# Patient Record
Sex: FEMALE | Race: WHITE | Employment: FULL TIME | URBAN - METROPOLITAN AREA
[De-identification: names, ages, dates, MRNs, and addresses within clinical notes are randomized per-mention and may not be internally consistent; named-entity substitution may affect disease eponyms.]

---

## 2017-05-30 ENCOUNTER — HOSPITAL ENCOUNTER (OUTPATIENT)
Dept: MAMMOGRAPHY | Age: 67
Discharge: HOME | End: 2017-05-30
Attending: FAMILY MEDICINE
Payer: MEDICARE

## 2017-05-30 DIAGNOSIS — Z12.31 VISIT FOR SCREENING MAMMOGRAM: ICD-10-CM

## 2017-05-30 PROCEDURE — 77067 SCR MAMMO BI INCL CAD: CPT

## 2018-07-10 ENCOUNTER — HOSPITAL ENCOUNTER (OUTPATIENT)
Dept: MAMMOGRAPHY | Age: 68
Discharge: HOME | End: 2018-07-10
Attending: FAMILY MEDICINE
Payer: MEDICARE

## 2018-07-10 DIAGNOSIS — Z12.39 SCREENING BREAST EXAMINATION: ICD-10-CM

## 2018-07-10 PROCEDURE — 77067 SCR MAMMO BI INCL CAD: CPT

## 2019-07-16 ENCOUNTER — HOSPITAL ENCOUNTER (OUTPATIENT)
Dept: MAMMOGRAPHY | Age: 69
Discharge: HOME | End: 2019-07-16
Attending: FAMILY MEDICINE
Payer: MEDICARE

## 2019-07-16 DIAGNOSIS — Z12.31 VISIT FOR SCREENING MAMMOGRAM: ICD-10-CM

## 2019-07-16 PROCEDURE — 77067 SCR MAMMO BI INCL CAD: CPT

## 2020-08-27 ENCOUNTER — HOSPITAL ENCOUNTER (OUTPATIENT)
Dept: MAMMOGRAPHY | Age: 70
Discharge: HOME | End: 2020-08-27
Attending: NURSE PRACTITIONER
Payer: MEDICARE

## 2020-08-27 DIAGNOSIS — Z12.31 ENCOUNTER FOR MAMMOGRAM TO ESTABLISH BASELINE MAMMOGRAM: ICD-10-CM

## 2020-08-27 PROCEDURE — 77067 SCR MAMMO BI INCL CAD: CPT

## 2021-08-03 ENCOUNTER — TRANSCRIBE ORDER (OUTPATIENT)
Dept: SCHEDULING | Age: 71
End: 2021-08-03

## 2021-08-03 DIAGNOSIS — Z12.31 VISIT FOR SCREENING MAMMOGRAM: Primary | ICD-10-CM

## 2021-09-02 ENCOUNTER — HOSPITAL ENCOUNTER (OUTPATIENT)
Dept: MAMMOGRAPHY | Age: 71
Discharge: HOME | End: 2021-09-02
Attending: NURSE PRACTITIONER
Payer: MEDICARE

## 2021-09-02 DIAGNOSIS — Z12.31 VISIT FOR SCREENING MAMMOGRAM: ICD-10-CM

## 2021-09-02 PROCEDURE — 77067 SCR MAMMO BI INCL CAD: CPT

## 2021-11-05 ENCOUNTER — TRANSCRIBE ORDER (OUTPATIENT)
Dept: SCHEDULING | Age: 71
End: 2021-11-05

## 2021-11-05 DIAGNOSIS — M54.12 CERVICAL RADICULITIS: Primary | ICD-10-CM

## 2021-11-05 DIAGNOSIS — M54.2 CERVICALGIA: ICD-10-CM

## 2021-11-16 ENCOUNTER — HOSPITAL ENCOUNTER (OUTPATIENT)
Dept: MRI IMAGING | Age: 71
Discharge: HOME | End: 2021-11-16
Attending: FAMILY MEDICINE
Payer: MEDICARE

## 2021-11-16 DIAGNOSIS — M54.12 CERVICAL RADICULITIS: ICD-10-CM

## 2021-11-16 DIAGNOSIS — M54.2 CERVICALGIA: ICD-10-CM

## 2021-11-16 PROCEDURE — 72141 MRI NECK SPINE W/O DYE: CPT

## 2021-12-20 ENCOUNTER — APPOINTMENT (OUTPATIENT)
Dept: CT IMAGING | Age: 71
DRG: 177 | End: 2021-12-20
Attending: EMERGENCY MEDICINE
Payer: MEDICARE

## 2021-12-20 ENCOUNTER — APPOINTMENT (OUTPATIENT)
Dept: GENERAL RADIOLOGY | Age: 71
DRG: 177 | End: 2021-12-20
Attending: EMERGENCY MEDICINE
Payer: MEDICARE

## 2021-12-20 ENCOUNTER — HOSPITAL ENCOUNTER (INPATIENT)
Age: 71
LOS: 2 days | Discharge: HOME HEALTH | DRG: 177 | End: 2021-12-22
Attending: EMERGENCY MEDICINE | Admitting: HOSPITALIST
Payer: MEDICARE

## 2021-12-20 DIAGNOSIS — J12.82 PNEUMONIA DUE TO COVID-19 VIRUS: Primary | ICD-10-CM

## 2021-12-20 DIAGNOSIS — U07.1 PNEUMONIA DUE TO COVID-19 VIRUS: Primary | ICD-10-CM

## 2021-12-20 DIAGNOSIS — J96.01 ACUTE RESPIRATORY FAILURE WITH HYPOXIA (HCC): ICD-10-CM

## 2021-12-20 LAB
ALBUMIN SERPL-MCNC: 2.9 G/DL (ref 3.5–5)
ALBUMIN/GLOB SERPL: 0.9 {RATIO} (ref 1.1–2.2)
ALP SERPL-CCNC: 57 U/L (ref 45–117)
ALT SERPL-CCNC: 34 U/L (ref 12–78)
ANION GAP SERPL CALC-SCNC: 5 MMOL/L (ref 5–15)
AST SERPL-CCNC: 42 U/L (ref 15–37)
ATRIAL RATE: 91 BPM
BASOPHILS # BLD: 0 K/UL (ref 0–0.1)
BASOPHILS NFR BLD: 0 % (ref 0–1)
BILIRUB SERPL-MCNC: 0.3 MG/DL (ref 0.2–1)
BUN SERPL-MCNC: 7 MG/DL (ref 6–20)
BUN/CREAT SERPL: 10 (ref 12–20)
CALCIUM SERPL-MCNC: 8.2 MG/DL (ref 8.5–10.1)
CALCULATED P AXIS, ECG09: 48 DEGREES
CALCULATED R AXIS, ECG10: 48 DEGREES
CALCULATED T AXIS, ECG11: 34 DEGREES
CHLORIDE SERPL-SCNC: 102 MMOL/L (ref 97–108)
CO2 SERPL-SCNC: 29 MMOL/L (ref 21–32)
COVID-19 RAPID TEST, COVR: DETECTED
CREAT SERPL-MCNC: 0.68 MG/DL (ref 0.55–1.02)
CRP SERPL-MCNC: 1.1 MG/DL (ref 0–0.6)
D DIMER PPP FEU-MCNC: 1.33 MG/L FEU (ref 0–0.65)
DIAGNOSIS, 93000: NORMAL
DIFFERENTIAL METHOD BLD: ABNORMAL
EOSINOPHIL # BLD: 0 K/UL (ref 0–0.4)
EOSINOPHIL NFR BLD: 0 % (ref 0–7)
ERYTHROCYTE [DISTWIDTH] IN BLOOD BY AUTOMATED COUNT: 12.6 % (ref 11.5–14.5)
GLOBULIN SER CALC-MCNC: 3.3 G/DL (ref 2–4)
GLUCOSE SERPL-MCNC: 139 MG/DL (ref 65–100)
HCT VFR BLD AUTO: 42.1 % (ref 35–47)
HGB BLD-MCNC: 13.9 G/DL (ref 11.5–16)
IMM GRANULOCYTES # BLD AUTO: 0 K/UL (ref 0–0.04)
IMM GRANULOCYTES NFR BLD AUTO: 1 % (ref 0–0.5)
LDH SERPL L TO P-CCNC: 270 U/L (ref 81–246)
LYMPHOCYTES # BLD: 0.7 K/UL (ref 0.8–3.5)
LYMPHOCYTES NFR BLD: 17 % (ref 12–49)
MCH RBC QN AUTO: 30 PG (ref 26–34)
MCHC RBC AUTO-ENTMCNC: 33 G/DL (ref 30–36.5)
MCV RBC AUTO: 90.9 FL (ref 80–99)
MONOCYTES # BLD: 0.2 K/UL (ref 0–1)
MONOCYTES NFR BLD: 6 % (ref 5–13)
NEUTS SEG # BLD: 3 K/UL (ref 1.8–8)
NEUTS SEG NFR BLD: 76 % (ref 32–75)
NRBC # BLD: 0 K/UL (ref 0–0.01)
NRBC BLD-RTO: 0 PER 100 WBC
P-R INTERVAL, ECG05: 116 MS
PLATELET # BLD AUTO: 129 K/UL (ref 150–400)
PMV BLD AUTO: 12.2 FL (ref 8.9–12.9)
POTASSIUM SERPL-SCNC: 3.6 MMOL/L (ref 3.5–5.1)
PROCALCITONIN SERPL-MCNC: <0.05 NG/ML
PROT SERPL-MCNC: 6.2 G/DL (ref 6.4–8.2)
Q-T INTERVAL, ECG07: 336 MS
QRS DURATION, ECG06: 66 MS
QTC CALCULATION (BEZET), ECG08: 413 MS
RBC # BLD AUTO: 4.63 M/UL (ref 3.8–5.2)
RBC MORPH BLD: ABNORMAL
SODIUM SERPL-SCNC: 136 MMOL/L (ref 136–145)
SOURCE, COVRS: ABNORMAL
VENTRICULAR RATE, ECG03: 91 BPM
WBC # BLD AUTO: 3.9 K/UL (ref 3.6–11)

## 2021-12-20 PROCEDURE — 80053 COMPREHEN METABOLIC PANEL: CPT

## 2021-12-20 PROCEDURE — 93005 ELECTROCARDIOGRAM TRACING: CPT

## 2021-12-20 PROCEDURE — 84145 PROCALCITONIN (PCT): CPT

## 2021-12-20 PROCEDURE — 96374 THER/PROPH/DIAG INJ IV PUSH: CPT

## 2021-12-20 PROCEDURE — 86140 C-REACTIVE PROTEIN: CPT

## 2021-12-20 PROCEDURE — 71275 CT ANGIOGRAPHY CHEST: CPT

## 2021-12-20 PROCEDURE — 36415 COLL VENOUS BLD VENIPUNCTURE: CPT

## 2021-12-20 PROCEDURE — 99285 EMERGENCY DEPT VISIT HI MDM: CPT

## 2021-12-20 PROCEDURE — 74011250637 HC RX REV CODE- 250/637: Performed by: HOSPITALIST

## 2021-12-20 PROCEDURE — 74011250636 HC RX REV CODE- 250/636: Performed by: HOSPITALIST

## 2021-12-20 PROCEDURE — 74011250636 HC RX REV CODE- 250/636: Performed by: EMERGENCY MEDICINE

## 2021-12-20 PROCEDURE — 65660000000 HC RM CCU STEPDOWN

## 2021-12-20 PROCEDURE — 87635 SARS-COV-2 COVID-19 AMP PRB: CPT

## 2021-12-20 PROCEDURE — 83615 LACTATE (LD) (LDH) ENZYME: CPT

## 2021-12-20 PROCEDURE — 85025 COMPLETE CBC W/AUTO DIFF WBC: CPT

## 2021-12-20 PROCEDURE — 74011000636 HC RX REV CODE- 636: Performed by: HOSPITALIST

## 2021-12-20 PROCEDURE — 85379 FIBRIN DEGRADATION QUANT: CPT

## 2021-12-20 PROCEDURE — 71045 X-RAY EXAM CHEST 1 VIEW: CPT

## 2021-12-20 RX ORDER — ZINC SULFATE 50(220)MG
1 CAPSULE ORAL DAILY
Status: DISCONTINUED | OUTPATIENT
Start: 2021-12-20 | End: 2021-12-22 | Stop reason: HOSPADM

## 2021-12-20 RX ORDER — SODIUM CHLORIDE 9 MG/ML
75 INJECTION, SOLUTION INTRAVENOUS CONTINUOUS
Status: DISPENSED | OUTPATIENT
Start: 2021-12-20 | End: 2021-12-21

## 2021-12-20 RX ORDER — ENOXAPARIN SODIUM 100 MG/ML
40 INJECTION SUBCUTANEOUS DAILY
Status: DISCONTINUED | OUTPATIENT
Start: 2021-12-21 | End: 2021-12-22 | Stop reason: HOSPADM

## 2021-12-20 RX ORDER — ONDANSETRON 2 MG/ML
4 INJECTION INTRAMUSCULAR; INTRAVENOUS
Status: DISCONTINUED | OUTPATIENT
Start: 2021-12-20 | End: 2021-12-22 | Stop reason: HOSPADM

## 2021-12-20 RX ORDER — SODIUM CHLORIDE 0.9 % (FLUSH) 0.9 %
5-40 SYRINGE (ML) INJECTION EVERY 8 HOURS
Status: DISCONTINUED | OUTPATIENT
Start: 2021-12-20 | End: 2021-12-22 | Stop reason: HOSPADM

## 2021-12-20 RX ORDER — ALBUTEROL SULFATE 90 UG/1
2 AEROSOL, METERED RESPIRATORY (INHALATION)
Status: DISCONTINUED | OUTPATIENT
Start: 2021-12-20 | End: 2021-12-22 | Stop reason: HOSPADM

## 2021-12-20 RX ORDER — ACETAMINOPHEN 325 MG/1
650 TABLET ORAL
Status: DISCONTINUED | OUTPATIENT
Start: 2021-12-20 | End: 2021-12-22 | Stop reason: HOSPADM

## 2021-12-20 RX ORDER — SODIUM CHLORIDE 0.9 % (FLUSH) 0.9 %
5-40 SYRINGE (ML) INJECTION AS NEEDED
Status: DISCONTINUED | OUTPATIENT
Start: 2021-12-20 | End: 2021-12-22 | Stop reason: HOSPADM

## 2021-12-20 RX ORDER — DEXAMETHASONE SODIUM PHOSPHATE 10 MG/ML
6 INJECTION INTRAMUSCULAR; INTRAVENOUS EVERY 24 HOURS
Status: DISCONTINUED | OUTPATIENT
Start: 2021-12-21 | End: 2021-12-20

## 2021-12-20 RX ORDER — DEXAMETHASONE SODIUM PHOSPHATE 4 MG/ML
6 INJECTION, SOLUTION INTRA-ARTICULAR; INTRALESIONAL; INTRAMUSCULAR; INTRAVENOUS; SOFT TISSUE ONCE
Status: COMPLETED | OUTPATIENT
Start: 2021-12-20 | End: 2021-12-20

## 2021-12-20 RX ORDER — ASCORBIC ACID 500 MG
500 TABLET ORAL 2 TIMES DAILY
Status: DISCONTINUED | OUTPATIENT
Start: 2021-12-20 | End: 2021-12-22 | Stop reason: HOSPADM

## 2021-12-20 RX ORDER — POLYETHYLENE GLYCOL 3350 17 G/17G
17 POWDER, FOR SOLUTION ORAL DAILY PRN
Status: DISCONTINUED | OUTPATIENT
Start: 2021-12-20 | End: 2021-12-22 | Stop reason: HOSPADM

## 2021-12-20 RX ORDER — ONDANSETRON 4 MG/1
4 TABLET, ORALLY DISINTEGRATING ORAL
Status: DISCONTINUED | OUTPATIENT
Start: 2021-12-20 | End: 2021-12-22 | Stop reason: HOSPADM

## 2021-12-20 RX ORDER — DEXAMETHASONE SODIUM PHOSPHATE 4 MG/ML
4 INJECTION, SOLUTION INTRA-ARTICULAR; INTRALESIONAL; INTRAMUSCULAR; INTRAVENOUS; SOFT TISSUE EVERY 24 HOURS
Status: DISCONTINUED | OUTPATIENT
Start: 2021-12-20 | End: 2021-12-22 | Stop reason: HOSPADM

## 2021-12-20 RX ORDER — VENLAFAXINE HYDROCHLORIDE 37.5 MG/1
75 CAPSULE, EXTENDED RELEASE ORAL DAILY
Status: DISCONTINUED | OUTPATIENT
Start: 2021-12-20 | End: 2021-12-22 | Stop reason: HOSPADM

## 2021-12-20 RX ORDER — ACETAMINOPHEN 650 MG/1
650 SUPPOSITORY RECTAL
Status: DISCONTINUED | OUTPATIENT
Start: 2021-12-20 | End: 2021-12-22 | Stop reason: HOSPADM

## 2021-12-20 RX ADMIN — DEXAMETHASONE SODIUM PHOSPHATE 6 MG: 4 INJECTION, SOLUTION INTRAMUSCULAR; INTRAVENOUS at 11:10

## 2021-12-20 RX ADMIN — VENLAFAXINE HYDROCHLORIDE 75 MG: 37.5 CAPSULE, EXTENDED RELEASE ORAL at 17:35

## 2021-12-20 RX ADMIN — Medication 10 ML: at 20:00

## 2021-12-20 RX ADMIN — IOPAMIDOL 80 ML: 755 INJECTION, SOLUTION INTRAVENOUS at 12:52

## 2021-12-20 RX ADMIN — SODIUM CHLORIDE 75 ML/HR: 9 INJECTION, SOLUTION INTRAVENOUS at 14:54

## 2021-12-20 RX ADMIN — OXYCODONE HYDROCHLORIDE AND ACETAMINOPHEN 500 MG: 500 TABLET ORAL at 22:08

## 2021-12-20 RX ADMIN — ACETAMINOPHEN 650 MG: 325 TABLET ORAL at 22:08

## 2021-12-20 RX ADMIN — ZINC SULFATE 220 MG (50 MG) CAPSULE 1 CAPSULE: CAPSULE at 14:51

## 2021-12-20 NOTE — PROGRESS NOTES
Transition of Care Plan:    RUR: 7 %, LOW  Disposition: Home vs SNF PT/OT consults pending   Follow up appointments: PCP  DME needed: TBD  Transportation at Discharge: pt's sister vs. BLS transport   [place] or means to access home:   Yes     IM Medicare Letter: 2nd IM Letter before   Is patient a BCPI-A Bundle:       N/A    If yes, was Bundle Letter given?:    Is patient a [place] and connected with the [place]? N/A  If yes, was [place] transfer form completed and [facility] notified? Caregiver Contact: Sister Rodolfo Santiago [phone]  Discharge Caregiver contacted prior to discharge? Unit CM to follow up before discharge    Reason for Admission:  COVID Pneumonia                      RUR Score:   7%                   Plan for utilizing home health:  TBD        PCP: First and Last name:  Dago Rodriguez NP     Name of Practice: [facility]    Are you a current patient: Yes/No:  Yes   Approximate date of last visit: few months ago    Can you participate in a virtual visit with your PCP:  Yes                    Current Advanced Directive/Advance Care Plan: Full Code  Advance Care Planning     General Advance Care Planning (ACP) Conversation      Date of Conversation: 12/20/2021  Conducted with: Patient with Decision Making Capacity    Healthcare Decision Maker:  Barbara Perez, Legal Next of Kin  No healthcare decision makers have been documented. Click here to complete [address] including selection of the Healthcare Decision Maker Relationship (ie \"Primary\")    Today we documented Decision Maker(s) consistent with Legal Next of Kin hierarchy.     Content/Action Overview:   DECLINED ACP conversation - will revisit periodically   Reviewed DNR/DNI and patient elects Full Code (Attempt Resuscitation)    Length of Voluntary ACP Conversation in minutes:  <16 minutes (Non-Billable)    Thais Musa RN                      Transition of Care Plan:                  '  CM placed call to patient's mobile phone  to discuss discharge planning. Patient name, , and demographics all verified in chart. Patient lives in a one story home with her . Patient's  is currently hospitalized here at [address] Patient is her 's main caregiver. Patient is independent of her ADLS/IDLS including driving. Patient does not use any DME. Preferred pharmacy is [facility]. Patient is active with her PCP. Care Management Interventions  PCP Verified by CM: Yes  Mode of Transport at Discharge: Other (see comment) (Patient's sister to transport vs. BLS transport if transition to SNF)  Transition of Care Consult (CM Consult): Discharge Planning  Discharge Durable Medical Equipment: No  Physical Therapy Consult: Yes  Occupational Therapy Consult: Yes  Speech Therapy Consult: No  Support Systems: Spouse/Significant Other,Other Family Member(s)  Confirm Follow Up Transport: Self  Discharge Location  Discharge Placement: Unable to determine at this time    Unit CM to continue to follow for discharge needs and planning.     Randi Spence, MARGARETTEN, RN, BSW   RN Care Manager

## 2021-12-20 NOTE — ED PROVIDER NOTES
EMERGENCY DEPARTMENT HISTORY AND PHYSICAL EXAM      Date: 12/20/2021  Patient Name: Sal Dolan    History of Presenting Illness     Chief Complaint   Patient presents with    Lethargy     Pt c/o generalized weakness, pain all over body and covid exposure. History Provided By: Patient    HPI: Sal Dolan, 70 y.o. female presents to the ED with cc of shortness of breath. 79-year-old female with no significant past medical history presents with lethargy and weakness. 79-year-old female reports symptoms started 2 weeks ago. Reports myalgias and body aches. She is not vaccinated against Covid. She reports she developed a cough as well as headache, loss of appetite and decreased p.o. intake. Her  tested positive for Covid and was admitted to the hospital a few days ago. Patient reports she is going to get tested today but felt so weak that EMS was called. There are no other complaints, changes, or physical findings at this time. PCP: Adarsh Santiago NP    No current facility-administered medications on file prior to encounter. Current Outpatient Medications on File Prior to Encounter   Medication Sig Dispense Refill    venlafaxine-SR (EFFEXOR XR) 75 mg capsule Take 75 mg by mouth daily.  [DISCONTINUED] aspirin (ASPIRIN) 325 mg tablet Take 325 mg by mouth daily.  [DISCONTINUED] Omega-3 Fatty Acids (FISH OIL) 300 mg Cap Take 600 mg by mouth.            Past History     Past Medical History:  Past Medical History:   Diagnosis Date    Arthritis     hip, neck, shoulder    Chronic pain     back    GERD (gastroesophageal reflux disease)     Psychiatric disorder     anxiety       Past Surgical History:  Past Surgical History:   Procedure Laterality Date    HX ORTHOPAEDIC  1980    Ulna nerve repair Right arm    HX ORTHOPAEDIC  1982    Tendon repair Right hand    HX TUBAL LIGATION  1989       Family History:  Family History   Problem Relation Age of Onset    Breast Cancer Paternal Grandmother     Heart Failure Mother     Heart Attack Father     Alcohol abuse Father        Social History:  Social History     Tobacco Use    Smoking status: Former Smoker     Quit date: 1996     Years since quittin.4    Smokeless tobacco: Not on file   Substance Use Topics    Alcohol use: Yes     Comment: social    Drug use: Not on file       Allergies: Allergies   Allergen Reactions    Lipitor [Atorvastatin] Myalgia     Muscle aches         Review of Systems   Review of Systems   Constitutional: Positive for appetite change and chills. Negative for activity change and fever. HENT: Negative for facial swelling and voice change. Eyes: Negative for redness. Respiratory: Positive for cough. Negative for shortness of breath and wheezing. Cardiovascular: Negative for chest pain and leg swelling. Gastrointestinal: Negative for abdominal pain, diarrhea, nausea and vomiting. Genitourinary: Negative for decreased urine volume. Musculoskeletal: Positive for arthralgias and myalgias. Negative for gait problem. Skin: Negative for pallor and rash. Neurological: Negative for tremors and facial asymmetry. Psychiatric/Behavioral: Negative for agitation. All other systems reviewed and are negative. Physical Exam   Physical Exam  Vitals and nursing note reviewed. Constitutional:       Comments: 77-year-old female, resting on stretcher, appears weak   HENT:      Head: Normocephalic and atraumatic. Cardiovascular:      Rate and Rhythm: Normal rate and regular rhythm. Heart sounds: No murmur heard. No friction rub. No gallop. Pulmonary:      Effort: Pulmonary effort is normal.      Breath sounds: Rhonchi (Bilateral lower lobes) present. Comments: Saturating 88% on room air  Abdominal:      Palpations: Abdomen is soft. Tenderness: There is no abdominal tenderness. Musculoskeletal:         General: No swelling. Normal range of motion. Cervical back: Normal range of motion. Skin:     General: Skin is warm. Capillary Refill: Capillary refill takes less than 2 seconds. Neurological:      General: No focal deficit present. Mental Status: She is alert. Psychiatric:         Mood and Affect: Mood normal.         Diagnostic Study Results     Labs -     Recent Results (from the past 12 hour(s))   CBC WITH AUTOMATED DIFF    Collection Time: 12/20/21  9:52 AM   Result Value Ref Range    WBC 3.9 3.6 - 11.0 K/uL    RBC 4.63 3.80 - 5.20 M/uL    HGB 13.9 11.5 - 16.0 g/dL    HCT 42.1 35.0 - 47.0 %    MCV 90.9 80.0 - 99.0 FL    MCH 30.0 26.0 - 34.0 PG    MCHC 33.0 30.0 - 36.5 g/dL    RDW 12.6 11.5 - 14.5 %    PLATELET 158 (L) 773 - 400 K/uL    MPV 12.2 8.9 - 12.9 FL    NRBC 0.0 0  WBC    ABSOLUTE NRBC 0.00 0.00 - 0.01 K/uL    NEUTROPHILS 76 (H) 32 - 75 %    LYMPHOCYTES 17 12 - 49 %    MONOCYTES 6 5 - 13 %    EOSINOPHILS 0 0 - 7 %    BASOPHILS 0 0 - 1 %    IMMATURE GRANULOCYTES 1 (H) 0.0 - 0.5 %    ABS. NEUTROPHILS 3.0 1.8 - 8.0 K/UL    ABS. LYMPHOCYTES 0.7 (L) 0.8 - 3.5 K/UL    ABS. MONOCYTES 0.2 0.0 - 1.0 K/UL    ABS. EOSINOPHILS 0.0 0.0 - 0.4 K/UL    ABS. BASOPHILS 0.0 0.0 - 0.1 K/UL    ABS. IMM.  GRANS. 0.0 0.00 - 0.04 K/UL    DF SMEAR SCANNED      RBC COMMENTS NORMOCYTIC, NORMOCHROMIC     D DIMER    Collection Time: 12/20/21  9:52 AM   Result Value Ref Range    D-dimer 1.33 (H) 0.00 - 0.65 mg/L FEU   PROCALCITONIN    Collection Time: 12/20/21  9:52 AM   Result Value Ref Range    Procalcitonin <0.05 ng/mL   COVID-19 RAPID TEST    Collection Time: 12/20/21  9:52 AM   Result Value Ref Range    Specimen source Nasopharyngeal      COVID-19 rapid test Detected (AA) NOTD     LD    Collection Time: 12/20/21 10:38 AM   Result Value Ref Range     (H) 81 - 246 U/L   C REACTIVE PROTEIN, QT    Collection Time: 12/20/21 10:38 AM   Result Value Ref Range    C-Reactive protein 1.10 (H) 0.00 - 7.99 mg/dL   METABOLIC PANEL, COMPREHENSIVE Collection Time: 12/20/21 10:38 AM   Result Value Ref Range    Sodium 136 136 - 145 mmol/L    Potassium 3.6 3.5 - 5.1 mmol/L    Chloride 102 97 - 108 mmol/L    CO2 29 21 - 32 mmol/L    Anion gap 5 5 - 15 mmol/L    Glucose 139 (H) 65 - 100 mg/dL    BUN 7 6 - 20 MG/DL    Creatinine 0.68 0.55 - 1.02 MG/DL    BUN/Creatinine ratio 10 (L) 12 - 20      GFR est AA >60 >60 ml/min/1.73m2    GFR est non-AA >60 >60 ml/min/1.73m2    Calcium 8.2 (L) 8.5 - 10.1 MG/DL    Bilirubin, total 0.3 0.2 - 1.0 MG/DL    ALT (SGPT) 34 12 - 78 U/L    AST (SGOT) 42 (H) 15 - 37 U/L    Alk. phosphatase 57 45 - 117 U/L    Protein, total 6.2 (L) 6.4 - 8.2 g/dL    Albumin 2.9 (L) 3.5 - 5.0 g/dL    Globulin 3.3 2.0 - 4.0 g/dL    A-G Ratio 0.9 (L) 1.1 - 2.2         Radiologic Studies -   CTA CHEST W OR W WO CONT   Final Result      1. Patchy bilateral groundglass opacities with a peripheral distribution with   superimposed basilar atelectasis. No acute pulmonary embolus. XR CHEST PORT   Final Result   Minimal bilateral infiltrates. CT Results  (Last 48 hours)               12/20/21 1252  CTA CHEST W OR W WO CONT Final result    Impression:      1. Patchy bilateral groundglass opacities with a peripheral distribution with   superimposed basilar atelectasis. No acute pulmonary embolus. Narrative:  INDICATION: COVID positive patient with elevated d-dimer. Hypoxia. COMPARISON: Earlier same day       TECHNIQUE:  2.5 mm axial images were obtained from the bases to the lung apices   after the intravenous administration of 80 cc of Isovue-370. Three-dimensional   postprocessing was performed by the technologist with MIP reconstructions. CT   dose reduction was achieved through use of a standardized protocol tailored for   this examination and automatic exposure control for dose modulation. FINDINGS:       THYROID: No nodule. MEDIASTINUM: No mass or lymphadenopathy. WILFRED: No mass or lymphadenopathy.    THORACIC AORTA: No dissection or aneurysm. MAIN PULMONARY ARTERY: No acute pulmonary embolus   TRACHEA/BRONCHI: Patent. ESOPHAGUS: Small hiatal hernia   HEART: Normal in size. PLEURA: No effusion or pneumothorax. LUNGS: Partial collapse of the bilateral lung bases with superimposed patchy   peripheral groundglass opacities. INCIDENTALLY IMAGED UPPER ABDOMEN: No focal abnormality. BONES: No destructive bone lesion. CXR Results  (Last 48 hours)               12/20/21 1033  XR CHEST PORT Final result    Impression:  Minimal bilateral infiltrates. Narrative: Indication: Shortness of breath       Comparison: None       Portable exam of the chest obtained at 1033 demonstrates normal heart size. Minimal parenchymal opacities are noted in the right perihilar lung field and   left lung base. The osseous structures are unremarkable. Medical Decision Making   I am the first provider for this patient. I reviewed the vital signs, available nursing notes, past medical history, past surgical history, family history and social history. Vital Signs-Reviewed the patient's vital signs. Patient Vitals for the past 12 hrs:   Temp Pulse Resp BP SpO2   12/20/21 1305 -- -- -- 120/65 --   12/20/21 1225 -- 84 15 118/67 96 %   12/20/21 1215 -- 89 17 119/71 92 %   12/20/21 1140 -- 87 15 114/64 90 %   12/20/21 1125 -- 90 15 110/79 91 %   12/20/21 1113 -- -- -- -- 92 %   12/20/21 1110 -- 87 15 113/62 95 %   12/20/21 1055 -- 87 15 115/63 96 %   12/20/21 1046 -- -- -- -- 95 %   12/20/21 1040 -- 87 13 113/67 95 %   12/20/21 1025 -- 94 16 117/72 91 %   12/20/21 1021 -- -- -- -- 96 %   12/20/21 1010 -- 94 14 125/68 91 %   12/20/21 0955 -- 88 14 118/64 98 %   12/20/21 0949 98.8 °F (37.1 °C) 88 18 128/62 98 %     Records Reviewed: Nursing Notes    Provider Notes (Medical Decision Making):     22-year-old female presents emergency department with a chief complaint of lethargy. Known Covid exposure at home. Patient hypoxic on arrival.  I placed the patient on 4 L. Will check labs, chest x-ray. Suspect Covid pneumonia and acute hypoxic respiratory failure secondary to this. ED Course:   Initial assessment performed. The patients presenting problems have been discussed, and they are in agreement with the care plan formulated and outlined with them. I have encouraged them to ask questions as they arise throughout their visit. ED Course as of 12/20/21 1604   Mon Dec 20, 2021   0935 Preliminary EKG interpreted by me. Shows normal sinus rhythm with a HR of 91. No ST elevations or depressions concerning for ischemia. Normal intervals. [MB]   0338 Labs are notable for mild elevation of AST likely secondary to viral illness. CBC is unremarkable. Covid positive, D-dimer elevated, discussed with hospitalist will admit. Will pursue CTA to rule out PE. [MB]      ED Course User Index  [MB] MD Justine Lucio MD      Disposition:    Admitted      Diagnosis     Clinical Impression:   1. Pneumonia due to COVID-19 virus    2. Acute respiratory failure with hypoxia [facility])        Attestations:    Justine Fall MD    Please note that this dictation was completed with [employer], the computer voice recognition software. Quite often unanticipated grammatical, syntax, homophones, and other interpretive errors are inadvertently transcribed by the computer software. Please disregard these errors. Please excuse any errors that have escaped final proofreading. Thank you.

## 2021-12-20 NOTE — H&P
Hospitalist Admission Note    NAME: Hugo Kurtz   :  1950   MRN:  [id number]     Date/Time:  2021 11:49 AM    Patient PCP: Tramaine Cuevas NP    Please note that this dictation was completed with [employer], the computer voice recognition software. Quite often unanticipated grammatical, syntax, homophones, and other interpretive errors are inadvertently transcribed by the computer software. Please disregard these errors. Please excuse any errors that have escaped final proofreading  ______________________________________________________________________   Assessment & Plan:  Bilateral covid-19 pneumonia, POA with   Acute hypoxic respiratory failure, POA   88% RA, requiring 2L O2  Unvaccinated patient  Generalized weakness  --decadron 6mg IV daily x 10 days  --vitamin C, zinc  --outside of window for remdesivir as onset of sx 2 weeks ago  --await CRP and LDH. D-dimer mildly elevated. CTA chest pending  --consult pt/ot  --IVF NS 75ml/hr x 1 day and reassess    Anxiety d/o  --continue venlafaxine    Body mass index is 29.29 kg/m². Code: discussed, full   DVT prophylaxis: lovenox  Surrogate decision maker:   Nawaf Brasher or sister Daisy Mejia [phone] cell or home [phone]          Subjective:   CHIEF COMPLAINT:  Severe generalized weakness    HISTORY OF PRESENT ILLNESS:     Hugo Kurtz is a 70 y.o. female PMH anxiety, arthritis, unvaccinated for covid 19.  2 weeks ago, developed low grade fever, myalgias, waxing and waning generalized weakness, nonproductive cough, decreased appetite, diarrhea whenever she goes to void.  hospitalized for covid-19 about 2-3 days ago. She was going to get tested today with pcp but was too weak to get out of bed. Baseline independent ADLs. Denies chest pain, abdominal pain, vomiting. No dysuria, change in urination.     We were asked to admit for work up and evaluation of the above problems.      Past Medical History: Diagnosis Date    Arthritis     hip, neck, shoulder    Chronic pain     back    GERD (gastroesophageal reflux disease)     Psychiatric disorder     anxiety      Past Surgical History:   Procedure Laterality Date    HX ORTHOPAEDIC      Ulna nerve repair Right arm    HX ORTHOPAEDIC      Tendon repair Right hand    HX TUBAL LIGATION       Social History     Tobacco Use    Smoking status: Former Smoker     Quit date: 1996     Years since quittin.4    Smokeless tobacco: Not on file   Substance Use Topics    Alcohol use: Yes     Comment: social        Family History   Problem Relation Age of Onset    Breast Cancer Paternal Grandmother     Heart Failure Mother     Heart Attack Father     Alcohol abuse Father      Allergies   Allergen Reactions    Lipitor [Atorvastatin] Myalgia     Muscle aches        Prior to Admission medications    Medication Sig Start Date End Date Taking? Authorizing Provider   venlafaxine-SR (EFFEXOR XR) 75 mg capsule Take 75 mg by mouth daily. Provider, Historical   aspirin (ASPIRIN) 325 mg tablet Take 325 mg by mouth daily. Provider, Historical   Omega-3 Fatty Acids (FISH OIL) 300 mg Cap Take 600 mg by mouth. Provider, Historical     REVIEW OF SYSTEMS:  POSITIVE= Bold.   Negative = normal text  General:  fever, chills, sweats, generalized weakness, weight loss/gain, loss of appetite  Eyes:  blurred vision, eye pain, loss of vision, diplopia  Ear Nose and Throat:  rhinorrhea, pharyngitis  Respiratory:   cough, sputum production, SOB, wheezing, PHELAN, pleuritic pain  Cardiology:  chest pain, palpitations, orthopnea, PND, edema, syncope   Gastrointestinal:  abdominal pain, Nausea/V, dysphagia, diarrhea, constipation, bleeding  Genitourinary:  frequency, urgency, dysuria, hematuria, incontinence  Muskuloskeletal :  arthralgia, myalgia  Hematology:  easy bruising, bleeding, lymphadenopathy  Dermatological:  rash, ulceration, pruritis  Endocrine:  hot flashes or polydipsia  Neurological:  headache, dizziness, confusion, focal weakness, paresthesia, memory loss, gait disturbance  Psychological: anxiety, depression, agitation      Objective:   VITALS:    Visit Vitals  /62 (BP 1 Location: Right upper arm, BP Patient Position: At rest)   Pulse 88   Temp 98.8 °F (37.1 °C)   Resp 18   Ht 5' (1.524 m)   Wt 68 kg (150 lb)   SpO2 92%   BMI 29.29 kg/m²     Temp (24hrs), Av.8 °F (37.1 °C), Min:98.8 °F (37.1 °C), Max:98.8 °F (37.1 °C)    Body mass index is 29.29 kg/m². PHYSICAL EXAM:    General:    Awake, ill appearing female, profoundly weak, cooperative, no distress, appears stated age. HEENT: Atraumatic, anicteric sclerae, pink conjunctivae     No oral ulcers, mucosa dry, throat clear. Hearing intact. Neck:  Supple, symmetrical,  thyroid: non tender  Lungs:   Crackles throughout lung bases and mid lung b/l  No Wheezing or Rhonchi. .  Chest wall:  No tenderness  No Accessory muscle use. Heart:   Regular  rhythm,  No  murmur   No gallop. No edema. Abdomen:   Soft, non-tender. Not distended. Bowel sounds normal. No masses  Extremities: No cyanosis. No clubbing  Skin:     Not pale Not Jaundiced  No rashes   Psych:  Good insight. Not depressed. Not anxious or agitated. Neurologic: EOMs intact. No facial asymmetry. No aphasia or slurred speech. Symmetrical strength 3/5 arms and legs, Alert and oriented X 3.      IMAGING RESULTS:   []       I have personally reviewed the actual   []     CXR  []     CT scan  CXR:  CT :  EKG:  NSR, no ischemia   ________________________________________________________________________  Care Plan discussed with:    Comments   Patient y    [facility]:      ________________________________________________________________________  Prophylaxis:  GI none   DVT lovenox   ________________________________________________________________________  Recommended Disposition:   Home with Family y   HH/PT/OT/RN y   SNF/LTC    JOE    ________________________________________________________________________  Code Status:  Full Code y   DNR/DNI    ________________________________________________________________________  TOTAL TIME:  55 minutes  ______________________________________________________________________  Yang Liz MD      Procedures: see electronic medical records for all procedures/Xrays and details which were not copied into this note but were reviewed prior to creation of Plan. LAB DATA REVIEWED:    Recent Results (from the past 24 hour(s))   CBC WITH AUTOMATED DIFF    Collection Time: 12/20/21  9:52 AM   Result Value Ref Range    WBC 3.9 3.6 - 11.0 K/uL    RBC 4.63 3.80 - 5.20 M/uL    HGB 13.9 11.5 - 16.0 g/dL    HCT 42.1 35.0 - 47.0 %    MCV 90.9 80.0 - 99.0 FL    MCH 30.0 26.0 - 34.0 PG    MCHC 33.0 30.0 - 36.5 g/dL    RDW 12.6 11.5 - 14.5 %    PLATELET 206 (L) 092 - 400 K/uL    MPV 12.2 8.9 - 12.9 FL    NRBC 0.0 0  WBC    ABSOLUTE NRBC 0.00 0.00 - 0.01 K/uL    NEUTROPHILS 76 (H) 32 - 75 %    LYMPHOCYTES 17 12 - 49 %    MONOCYTES 6 5 - 13 %    EOSINOPHILS 0 0 - 7 %    BASOPHILS 0 0 - 1 %    IMMATURE GRANULOCYTES 1 (H) 0.0 - 0.5 %    ABS. NEUTROPHILS 3.0 1.8 - 8.0 K/UL    ABS. LYMPHOCYTES 0.7 (L) 0.8 - 3.5 K/UL    ABS. MONOCYTES 0.2 0.0 - 1.0 K/UL    ABS. EOSINOPHILS 0.0 0.0 - 0.4 K/UL    ABS. BASOPHILS 0.0 0.0 - 0.1 K/UL    ABS. IMM.  GRANS. 0.0 0.00 - 0.04 K/UL    DF SMEAR SCANNED      RBC COMMENTS NORMOCYTIC, NORMOCHROMIC     D DIMER    Collection Time: 12/20/21  9:52 AM   Result Value Ref Range    D-dimer 1.33 (H) 0.00 - 0.65 mg/L FEU   PROCALCITONIN    Collection Time: 12/20/21  9:52 AM   Result Value Ref Range    Procalcitonin <0.05 ng/mL   COVID-19 RAPID TEST    Collection Time: 12/20/21  9:52 AM   Result Value Ref Range    Specimen source Nasopharyngeal      COVID-19 rapid test Detected (AA) NOTD     LD    Collection Time: 12/20/21 10:38 AM   Result Value Ref Range  (H) 81 - 028 U/L   METABOLIC PANEL, COMPREHENSIVE    Collection Time: 12/20/21 10:38 AM   Result Value Ref Range    Sodium 136 136 - 145 mmol/L    Potassium 3.6 3.5 - 5.1 mmol/L    Chloride 102 97 - 108 mmol/L    CO2 29 21 - 32 mmol/L    Anion gap 5 5 - 15 mmol/L    Glucose 139 (H) 65 - 100 mg/dL    BUN 7 6 - 20 MG/DL    Creatinine 0.68 0.55 - 1.02 MG/DL    BUN/Creatinine ratio 10 (L) 12 - 20      GFR est AA >60 >60 ml/min/1.73m2    GFR est non-AA >60 >60 ml/min/1.73m2    Calcium 8.2 (L) 8.5 - 10.1 MG/DL    Bilirubin, total 0.3 0.2 - 1.0 MG/DL    ALT (SGPT) 34 12 - 78 U/L    AST (SGOT) 42 (H) 15 - 37 U/L    Alk.  phosphatase 57 45 - 117 U/L    Protein, total 6.2 (L) 6.4 - 8.2 g/dL    Albumin 2.9 (L) 3.5 - 5.0 g/dL    Globulin 3.3 2.0 - 4.0 g/dL    A-G Ratio 0.9 (L) 1.1 - 2.2

## 2021-12-21 LAB
ANION GAP SERPL CALC-SCNC: 5 MMOL/L (ref 5–15)
BUN SERPL-MCNC: 9 MG/DL (ref 6–20)
BUN/CREAT SERPL: 14 (ref 12–20)
CALCIUM SERPL-MCNC: 8.2 MG/DL (ref 8.5–10.1)
CHLORIDE SERPL-SCNC: 104 MMOL/L (ref 97–108)
CO2 SERPL-SCNC: 27 MMOL/L (ref 21–32)
CREAT SERPL-MCNC: 0.66 MG/DL (ref 0.55–1.02)
ERYTHROCYTE [DISTWIDTH] IN BLOOD BY AUTOMATED COUNT: 12.3 % (ref 11.5–14.5)
GLUCOSE SERPL-MCNC: 139 MG/DL (ref 65–100)
HCT VFR BLD AUTO: 39.8 % (ref 35–47)
HGB BLD-MCNC: 13.3 G/DL (ref 11.5–16)
MCH RBC QN AUTO: 30 PG (ref 26–34)
MCHC RBC AUTO-ENTMCNC: 33.4 G/DL (ref 30–36.5)
MCV RBC AUTO: 89.6 FL (ref 80–99)
NRBC # BLD: 0 K/UL (ref 0–0.01)
NRBC BLD-RTO: 0 PER 100 WBC
PLATELET # BLD AUTO: 148 K/UL (ref 150–400)
PMV BLD AUTO: 11.3 FL (ref 8.9–12.9)
POTASSIUM SERPL-SCNC: 4.1 MMOL/L (ref 3.5–5.1)
RBC # BLD AUTO: 4.44 M/UL (ref 3.8–5.2)
SODIUM SERPL-SCNC: 136 MMOL/L (ref 136–145)
WBC # BLD AUTO: 4.1 K/UL (ref 3.6–11)

## 2021-12-21 PROCEDURE — 74011250637 HC RX REV CODE- 250/637: Performed by: HOSPITALIST

## 2021-12-21 PROCEDURE — 65660000000 HC RM CCU STEPDOWN

## 2021-12-21 PROCEDURE — 94760 N-INVAS EAR/PLS OXIMETRY 1: CPT

## 2021-12-21 PROCEDURE — 97116 GAIT TRAINING THERAPY: CPT

## 2021-12-21 PROCEDURE — 97535 SELF CARE MNGMENT TRAINING: CPT | Performed by: OCCUPATIONAL THERAPIST

## 2021-12-21 PROCEDURE — 80048 BASIC METABOLIC PNL TOTAL CA: CPT

## 2021-12-21 PROCEDURE — 36415 COLL VENOUS BLD VENIPUNCTURE: CPT

## 2021-12-21 PROCEDURE — 85027 COMPLETE CBC AUTOMATED: CPT

## 2021-12-21 PROCEDURE — 97162 PT EVAL MOD COMPLEX 30 MIN: CPT

## 2021-12-21 PROCEDURE — 97165 OT EVAL LOW COMPLEX 30 MIN: CPT | Performed by: OCCUPATIONAL THERAPIST

## 2021-12-21 PROCEDURE — 77010033678 HC OXYGEN DAILY

## 2021-12-21 PROCEDURE — 74011250636 HC RX REV CODE- 250/636: Performed by: HOSPITALIST

## 2021-12-21 PROCEDURE — XW0DXM6 INTRODUCTION OF BARICITINIB INTO MOUTH AND PHARYNX, EXTERNAL APPROACH, NEW TECHNOLOGY GROUP 6: ICD-10-PCS | Performed by: INTERNAL MEDICINE

## 2021-12-21 RX ADMIN — Medication 10 ML: at 22:27

## 2021-12-21 RX ADMIN — ONDANSETRON 4 MG: 2 INJECTION INTRAMUSCULAR; INTRAVENOUS at 08:35

## 2021-12-21 RX ADMIN — SODIUM CHLORIDE 75 ML/HR: 9 INJECTION, SOLUTION INTRAVENOUS at 05:07

## 2021-12-21 RX ADMIN — BARICITINIB 4 MG: 2 TABLET, FILM COATED ORAL at 11:23

## 2021-12-21 RX ADMIN — OXYCODONE HYDROCHLORIDE AND ACETAMINOPHEN 500 MG: 500 TABLET ORAL at 17:27

## 2021-12-21 RX ADMIN — Medication 10 ML: at 05:12

## 2021-12-21 RX ADMIN — ZINC SULFATE 220 MG (50 MG) CAPSULE 1 CAPSULE: CAPSULE at 08:07

## 2021-12-21 RX ADMIN — ENOXAPARIN SODIUM 40 MG: 100 INJECTION SUBCUTANEOUS at 08:07

## 2021-12-21 RX ADMIN — DEXAMETHASONE SODIUM PHOSPHATE 4 MG: 4 INJECTION, SOLUTION INTRAMUSCULAR; INTRAVENOUS at 11:24

## 2021-12-21 RX ADMIN — OXYCODONE HYDROCHLORIDE AND ACETAMINOPHEN 500 MG: 500 TABLET ORAL at 08:07

## 2021-12-21 RX ADMIN — VENLAFAXINE HYDROCHLORIDE 75 MG: 37.5 CAPSULE, EXTENDED RELEASE ORAL at 08:07

## 2021-12-21 NOTE — PROGRESS NOTES
Problem: Self Care Deficits Care Plan (Adult)  Goal: *Acute Goals and Plan of Care (Insert Text)  Description: FUNCTIONAL STATUS PRIOR TO ADMISSION: Patient was independent and active without use of DME except for sitting on shower chair to bathe. Pain in back and hip which limits activity at times. HOME SUPPORT PRIOR TO ADMISSION: The patient lived with  whom has undiagnosed dementia but is functional.    Occupational Therapy Goals:  Initiated 12/21/2021  1. Patient will perform grooming standing with modified independence within 7 days. 2. Patient will perform upper body dressing and lower body dressing with modified independence within 7 days. 3. Patient will perform toileting with modified independence within 7 days. 4. Patient will transfer from toilet with modified independence using the least restrictive device and appropriate durable medical equipment within 7 days. 12/21/2021 1046 by Collette Overlie, OTR/L  Outcome: Not Met  12/21/2021 1045 by Collette Overlie, OTR/L  Outcome: Not Met   OCCUPATIONAL THERAPY EVALUATION  Patient: Nakul Henson (11 y.o. female)  Date: 12/21/2021  Primary Diagnosis: Pneumonia due to COVID-19 virus [U07.1, J12.82]       Precautions:  (droplet +, COVID +)    ASSESSMENT  Based on the objective data described below, the patient presents with decreased dynamic standing balance, decreased endurance and right hip/back pain. Pt reports at baseline she has decreased standing tolerance due to arthritis in her hip and back. CGA overall for mobility to bathroom with increased time and CGA. Pt was able to toilet and stand at sink to groom for ~6 minutes but needed to to complete brushing of teeth due to pain in her hip/back. Improved balance noted with use of rolling walker during mobility in room. She was able to be weaned to room air this session and was not overtly short of breath. All other vitals were stable.   She reports that she has a sister that is local as well as [employer] members that can drop off meals if needed. Pts  is also in the hospital and OT services are following him as well. Recommend home health with increased assist with IADLS at discharge. 92-94% with activity on room air    Current Level of Function Impacting Discharge (ADLs/self-care): CGA mobility without assist devices, supervision to CGA with RW    Functional Outcome Measure: The patient scored 65/100 on the barthel outcome measure which is indicative of minimal deficits with mobility and ADLS. Other factors to consider for discharge: needs initial increased assist with IADLs and would benefit from home health     Patient will benefit from skilled therapy intervention to address the above noted impairments. PLAN :  Recommendations and Planned Interventions: self care training, functional mobility training, therapeutic exercise, balance training, therapeutic activities, patient education, home safety training and family training/education    Frequency/Duration: Patient will be followed by occupational therapy 3 times a week to address goals. Recommendation for discharge: (in order for the patient to meet his/her long term goals)  Occupational therapy at least 2 days/week in the home AND ensure assist and/or supervision for safety with IADLS    This discharge recommendation:  Has not yet been discussed the attending provider and/or case management    IF patient discharges home will need the following DME: rolling walker       SUBJECTIVE:   Patient stated I really want to get up and walk.     OBJECTIVE DATA SUMMARY:   HISTORY:   Past Medical History:   Diagnosis Date    Arthritis     hip, neck, shoulder    Chronic pain     back    GERD (gastroesophageal reflux disease)     Psychiatric disorder     anxiety     Past Surgical History:   Procedure Laterality Date    HX ORTHOPAEDIC  1980    Ulna nerve repair Right arm    HX ORTHOPAEDIC  1982    Tendon repair Right hand    HX TUBAL LIGATION  1989       Expanded or extensive additional review of patient history: (reports she can stay on the first level of the home if needed)    Home Situation  Home Environment: Private residence  # Steps to Enter:  (has a ramp)  One/Two Story Residence: Two story  # of Interior Steps: 12  Living Alone: No  Support Systems: Spouse/Significant Other  Patient Expects to be Discharged to[de-identified] [employer]  Current DME Used/Available at Home: Shower chair  Tub or Shower Type: Tub/Shower combination    Hand dominance: Right    EXAMINATION OF PERFORMANCE DEFICITS:  Cognitive/Behavioral Status:  Neurologic State: Alert  Orientation Level: Oriented X4  Cognition: Appropriate decision making; Appropriate for age attention/concentration; Appropriate safety awareness  Perception: Appears intact  Perseveration: No perseveration noted  Safety/Judgement: Awareness of environment;Good awareness of safety precautions; Fall prevention;Home safety; Insight into deficits      Hearing: Auditory  Auditory Impairment: None    Vision/Perceptual:                           Acuity: Within Defined Limits    Corrective Lenses: Glasses    Range of Motion:    AROM: Within functional limits  PROM: Within functional limits                      Strength:  Strength: Generally decreased, functional                Coordination:  Coordination: Within functional limits            Tone & Sensation:    Tone: Normal  Sensation: Intact                      Balance:  Sitting: Intact  Standing: Impaired  Standing - Static: Good;Constant support  Standing - Dynamic : Fair;Constant support    Functional Mobility and Transfers for ADLs:  Bed Mobility:  Rolling: Supervision  Supine to Sit: Supervision  Scooting: Supervision; Additional time    Transfers:  Sit to Stand: Contact guard assistance  Stand to Sit: Contact guard assistance  Bed to Chair: Contact guard assistance; Additional time  Bathroom Mobility: Contact guard assistance  Toilet Transfer : Contact guard assistance; Additional time    ADL Assessment:  Feeding: Independent    Oral Facial Hygiene/Grooming: Contact guard assistance (standing, needed to sit due to pain )    Bathing: Contact guard assistance    Upper Body Dressing: Setup    Lower Body Dressing: Contact guard assistance    Toileting: Contact guard assistance                ADL Intervention and task modifications:       Grooming  Washing Hands: Contact guard assistance  Brushing Teeth: Contact guard assistance (needed to sit due to right hip pain)  Brushing/Combing Hair: Contact guard assistance         Toileting  Bladder Hygiene: Contact guard assistance  Clothing Management: Contact guard assistance    Cognitive Retraining  Safety/Judgement: Awareness of environment;Good awareness of safety precautions; Fall prevention;Home safety; Insight into deficits      Functional Measure:    Barthel Index:  Bathin  Bladder: 10  Bowels: 10  Groomin  Dressin  Feeding: 10  Mobility: 5  Stairs: 5  Toilet Use: 5  Transfer (Bed to Chair and Back): 10  Total: 65/100      The Barthel ADL Index: Guidelines  1. The index should be used as a record of what a patient does, not as a record of what a patient could do. 2. The main aim is to establish degree of independence from any help, physical or verbal, however minor and for whatever reason. 3. The need for supervision renders the patient not independent. 4. A patient's performance should be established using the best available evidence. Asking the patient, friends/relatives and nurses are the usual sources, but direct observation and common sense are also important. However direct testing is not needed. 5. Usually the patient's performance over the preceding 24-48 hours is important, but occasionally longer periods will be relevant. 6. Middle categories imply that the patient supplies over 50 per cent of the effort. 7. Use of aids to be independent is allowed.     Score Interpretation (from Sinoff 1997)    Independent   60-79 Minimally independent   40-59 Partially dependent   20-39 Very dependent   <20 Totally dependent     -Alejo Cuadra., Barthel, D.W. (1965). Functional evaluation: the Barthel Index. [address] ([address] (). The Barthel activities of daily living index: self-reporting versus actual performance in the old (> or = 75 years). Journal [address] 45(7), 14 [facility], JDAVIDF, Leslie Arizmendi, Korin Masters. (1999). Measuring the change in disability after inpatient rehabilitation; comparison of the responsiveness of the Barthel Index and Functional [place] Measure. Journal of Neurology, Neurosurgery, and Psychiatry, 66(4), 202-282. NITZA Acosta, JESSIE Montoya, & Nani Paredes M.A. (2004) Assessment of post-stroke quality of life in cost-effectiveness studies: The usefulness of the Barthel Index and the EuroQoL-5D. Quality of Life Research, 15, 162-24         Occupational Therapy Evaluation Charge Determination   History Examination Decision-Making   LOW Complexity : Brief history review  LOW Complexity : 1-3 performance deficits relating to physical, cognitive , or psychosocial skils that result in activity limitations and / or participation restrictions  LOW Complexity : No comorbidities that affect functional and no verbal or physical assistance needed to complete eval tasks       Based on the above components, the patient evaluation is determined to be of the following complexity level: LOW   Pain Ratin/10 right hip and lower back but improves with rest    Activity Tolerance:   Fair and requires rest breaks    After treatment patient left in no apparent distress:    Sitting in chair and Call bell within reach    COMMUNICATION/EDUCATION:   The patients plan of care was discussed with: Physical therapist, Registered nurse and patient.      Home safety education was provided and the patient/caregiver indicated understanding., Patient/family have participated as able in goal setting and plan of care. and Patient/family agree to work toward stated goals and plan of care. This patients plan of care is appropriate for delegation to NOLAN.     Thank you for this referral.  Rodolfo Bowden, OTR/L  Time Calculation: 27 mins

## 2021-12-21 NOTE — PROGRESS NOTES
Hospitalist Progress Note    NAME: Serge Simmons   :  1950   MRN:  [id number]       Assessment / Plan:  Bilateral covid-19 pneumonia, POA with   Acute hypoxic respiratory failure, POA   88% RA, requiring 2L O2  Unvaccinated patient  Generalized weakness  -Clinically: Very weak and debilitated, remains on 2 L O2  decadron 6mg IV daily x 10 days  vitamin C, zinc  outside of window for remdesivir as onset of sx 2 weeks ago  CRP 1.1 -- candidate for Baricitinib started    D-dimer 1.33  procalcitonine < 0.05 ---- no AB   CTA: Patchy bilateral groundglass opacities with a peripheral distribution with  superimposed basilar atelectasis. No acute pulmonary embolus.     Anxiety d/o  --continue venlafaxine     Body mass index is 29.29 kg/m².         Code: full   DVT prophylaxis: lovenox  Surrogate decision maker:  sister Adarsh Puentes [phone] cell or home [phone]    Sabrina Cyr --- has dementia       Estimated discharge date: 2-3 days   Barriers: clinical status     Recommended Disposition: TBD      Subjective:     Chief Complaint / Reason for Physician Visit: covid pneumonia   Appears very weak and debilitated  Slow response  No shortness of breath with conversation  Denies pain    Discussed with RN events overnight. Review of Systems:  Symptom Y/N Comments  Symptom Y/N Comments   Fever/Chills    Chest Pain n    Poor Appetite    Edema     Cough y   Abdominal Pain     Sputum    Joint Pain     SOB/PHELAN y   Pruritis/Rash     Nausea/vomit    Tolerating PT/OT     Diarrhea    Tolerating Diet     Constipation    Other       Could NOT obtain due to:      Objective:     VITALS:   Last 24hrs VS reviewed since prior progress note.  Most recent are:  Patient Vitals for the past 24 hrs:   Temp Pulse Resp BP SpO2   21 1216 98.1 °F (36.7 °C) 87 16 (!) 108/54 96 %   21 1020 97.9 °F (36.6 °C) 80 -- 119/75 92 %   21 0805 98 °F (36.7 °C) 72 14 123/69 100 %   21 0253 97.4 °F (36.3 °C) 69 16 116/75 96 %   12/20/21 2342 97.6 °F (36.4 °C) 72 18 117/73 97 %   12/20/21 1949 98.1 °F (36.7 °C) 80 18 116/84 97 %   12/20/21 1813 -- 74 14 128/68 97 %   12/20/21 1758 -- 78 14 126/76 98 %   12/20/21 1757 -- -- -- -- 95 %   12/20/21 1743 -- 76 14 134/78 95 %   12/20/21 1736 -- 85 18 127/76 97 %   12/20/21 1721 -- 84 19 126/66 97 %   12/20/21 1706 -- 77 16 115/68 95 %   12/20/21 1651 -- 74 15 126/75 95 %   12/20/21 1636 -- 84 17 116/72 93 %   12/20/21 1621 -- 81 17 107/66 94 %   12/20/21 1606 -- 76 16 118/70 93 %       Intake/Output Summary (Last 24 hours) at 12/21/2021 1551  Last data filed at 12/21/2021 0507  Gross per 24 hour   Intake --   Output 600 ml   Net -600 ml        I had a face to face encounter and independently examined this patient on 12/21/2021, as outlined below:  PHYSICAL EXAM:  General: WD, WN. Alert, cooperative, no acute distress, weak/debilitated     EENT:  EOMI. Anicteric sclerae. MMM  Resp:  diminished BS  bilaterally, no wheezing or rales. No accessory muscle use  CV:  Regular  rhythm,  No edema  GI:  Soft, Non distended, Non tender. +Bowel sounds  Neurologic:  Alert and oriented X 3, normal speech,   Psych:   Good insight. Not anxious nor agitated  Skin:  No rashes. No jaundice    Reviewed most current lab test results and cultures  YES  Reviewed most current radiology test results   YES  Review and summation of old records today    NO  Reviewed patient's current orders and MAR    YES  PMH/SH reviewed - no change compared to H&P  ________________________________________________________________________  Care Plan discussed with:    Comments   Patient y    Family      RN y    Care Manager y    Consultant                       y Multidiciplinary team rounds were held today with , nursing, pharmacist and clinical coordinator. Patient's plan of care was discussed; medications were reviewed and discharge planning was addressed. ________________________________________________________________________  Total NON critical care TIME:  35  Minutes    Total CRITICAL CARE TIME Spent:   Minutes non procedure based      Comments   >50% of visit spent in counseling and coordination of care y    ________________________________________________________________________  Nicolas Corona MD     Procedures: see electronic medical records for all procedures/Xrays and details which were not copied into this note but were reviewed prior to creation of Plan. LABS:  I reviewed today's most current labs and imaging studies.   Pertinent labs include:  Recent Labs     12/21/21  0015 12/20/21  0952   WBC 4.1 3.9   HGB 13.3 13.9   HCT 39.8 42.1   * 129*     Recent Labs     12/21/21  0015 12/20/21  1038    136   K 4.1 3.6    102   CO2 27 29   * 139*   BUN 9 7   CREA 0.66 0.68   CA 8.2* 8.2*   ALB  --  2.9*   TBILI  --  0.3   ALT  --  34       Signed: Nicolas Corona MD

## 2021-12-21 NOTE — PROGRESS NOTES
Problem: Airway Clearance - Ineffective  Goal: Achieve or maintain patent airway  Outcome: Progressing Towards Goal     Problem: Breathing Pattern - Ineffective  Goal: Ability to achieve and maintain a regular respiratory rate  Outcome: Progressing Towards Goal     Problem: Body Temperature -  Risk of, Imbalanced  Goal: Ability to maintain a body temperature within defined limits  Outcome: Progressing Towards Goal     Problem: Isolation Precautions - Risk of Spread of Infection  Goal: Prevent transmission of infectious organism to others  Outcome: Progressing Towards Goal     Problem: Nutrition Deficits  Goal: Optimize nutrtional status  Outcome: Progressing Towards Goal     Problem: Falls - Risk of  Goal: *Absence of Falls  Description: Document Shaji Fall Risk and appropriate interventions in the flowsheet.   Note: Fall Risk Interventions:  Mobility Interventions: Patient to call before getting OOB              Elimination Interventions: Call light in reach

## 2021-12-21 NOTE — PROGRESS NOTES
Will need home oxygen challenge prior to d/c re:  Pulse oximetry assessment   __% at rest on room air (if 88% or less, skip next steps)  __% while ambulating on room air  __% at rest on __LPM  __% while ambulating on __LPM      Transition of Care Plan:     RUR: 9% \"low risk\"  Disposition: Home with HH & follow-up appts  Follow up appointments: PCP  DME needed: Rolling walker- pt declined CM securing RW  Transportation at Discharge: Sister Brady Mccormack or means to access home:  Yes      Medicare Letter: Needed at d/c   Is patient a BCPI-A Bundle: No               If yes, was Bundle Letter given?:    Is patient a [place] and connected with the [place]? N/A  If yes, was [place] transfer form completed and [facility] notified? Caregiver Contact: Sister Vern Chaparro [phone]  Discharge Caregiver contacted prior to discharge? CM reviewed pt's chart. Noted PT/OT evals completed with recs for [facility] & DME: RW needed at discharge. D/t covid+ isolation precautions CM contacted pt by bedside phone. CM discussed recs for [facility] at discharge. Pt verbalized understanding & is agreeable. CM provided FOC; pt identified no preferences & is agreeable to CM sending referrals to providers accepting covid+ pts. CM sent referral to [facility]; will send addtl as needed. Pt currently on 2L 02 NC; will needs oxygen challenge prior to discharge. Pt reports no further questions or concerns at this time. *Pt's  is also admitted to [id number] & would like updates regarding d/c planning. Maynor Goodman MSW  Care Management

## 2021-12-21 NOTE — PROGRESS NOTES
Physician Progress Note      Jonathan Rodriguez  [facility] #:                  [id number]  :                       1950  ADMIT DATE:       2021 9:24 AM  DISCH DATE:  RESPONDING  PROVIDER #:        Ene Luna MD          QUERY TEXT:    Patient admitted with covid. Noted documentation of acute respiratory failure in H&P. In order to support the diagnosis of acute respiratory failure, please include additional clinical indicators in your documentation. Or please document if the diagnosis of acute respiratory failure has been ruled out after further study. The medical record reflects the following:  Risk Factors: covid pna  Clinical Indicators: H&P--no distress,   Crackles throughout lung bases and mid lung b/l  No Wheezing or Rhonchi. No Accessory muscle use. ED-Pulmonary effort is normal. Rhonchi (Bilateral lower lobes) present. 95%2L, rr 14-18  Treatment: ct chest, o2, decadron  Jeane Durant RN/CDI     Acute Respiratory Failure Clinical Indicators per [employer]-DR[employer] Training Guide and Quick Reference Guide:  pO2 < 60 mmHg or SpO2 (pulse oximetry) < 91% breathing room air  pCO2 > 50 and pH < 7.35  P/F ratio (pO2 / FIO2) < 300  pO2 decrease or pCO2 increase by 10 mmHg from baseline (if known)  Supplemental oxygen of 40% or more  Presence of respiratory distress, tachypnea, dyspnea, shortness of breath, wheezing  Unable to speak in complete sentences  Use of accessory muscles to breathe  Extreme anxiety and feeling of impending doom  Tripod position  Confusion/altered mental status/obtunded  Options provided:  -- Acute Respiratory Failure as evidenced by, Please document evidence.   -- Acute Respiratory Failure ruled out after study  -- Acute Respiratory Failure ruled out after study and Chronic Respiratory Failure confirmed  -- Other - I will add my own diagnosis  -- Disagree - Not applicable / Not valid  -- Disagree - Clinically unable to determine / Unknown  -- Refer to Clinical Documentation Reviewer    PROVIDER RESPONSE TEXT:    This patient is in acute respiratory failure as evidenced by 88% on RA at rest    Query created by: Bienvenido Grayson on 12/21/2021 11:07 AM      Electronically signed by:  Vivienne Gallardo MD 12/21/2021 3:59 PM

## 2021-12-21 NOTE — PROGRESS NOTES
Problem: Mobility Impaired (Adult and Pediatric)  Goal: *Acute Goals and Plan of Care (Insert Text)  Description: FUNCTIONAL STATUS PRIOR TO ADMISSION: Patient was independent and active without use of DME.    HOME SUPPORT PRIOR TO ADMISSION: The patient lived with  who is hospitalized with covid. Physical Therapy Goals  Initiated 12/21/2021  1. Patient will move from supine to sit and sit to supine  in bed with modified independence within 7 day(s). 2.  Patient will transfer from bed to chair and chair to bed with modified independence using the least restrictive device within 7 day(s). 3.  Patient will perform sit to stand with modified independence within 7 day(s). 4.  Patient will ambulate with modified independence for 100 feet with the least restrictive device within 7 day(s). Outcome: Not Met  PHYSICAL THERAPY EVALUATION  Patient: Isacc Rausch (43 y.o. female)  Date: 12/21/2021  Primary Diagnosis: Pneumonia due to COVID-19 virus [U07.1, J12.82]        Precautions:    (droplet +, COVID +)      ASSESSMENT  Based on the objective data described below, the patient presents with generalized weakness, decreased activity tolerance, and impaired balance. Pt was received in supine on 2L and cleared by nursing to mobilize. Pt placed on room air for session and oxygen remained >90% during session. She was able to come to the edge of the bed without assistance. Stood and ambulated a short distance with HHA. Rested and then ambulated into the bathroom with HHA. Worked with OT on ADLs at the sink. She needed to take a seated rest break, but vitals stable. Ambulated around the room with HHA and left sitting up in the chair on room air. Can return home with HHPT and additional support from someone other than her . Current Level of Function Impacting Discharge (mobility/balance): CGA/min A    Functional Outcome Measure:   The patient scored Total: 65/100 on the Barthel Index outcome measure which is indicative of being 35 in basic self-care. Other factors to consider for discharge:  has covid     Patient will benefit from skilled therapy intervention to address the above noted impairments. PLAN :  Recommendations and Planned Interventions: bed mobility training, transfer training, gait training, therapeutic exercises, patient and family training/education, and therapeutic activities      Frequency/Duration: Patient will be followed by physical therapy:  3 times a week to address goals. Recommendation for discharge: (in order for the patient to meet his/her long term goals)  Physical therapy at least 2 days/week in the home AND ensure assist and/or supervision for safety with mobility    This discharge recommendation:  Has not yet been discussed the attending provider and/or case management    IF patient discharges home will need the following DME: rolling walker         SUBJECTIVE:   Patient stated my hips are starting to hurts, I have arthritis.     OBJECTIVE DATA SUMMARY:   HISTORY:    Past Medical History:   Diagnosis Date    Arthritis     hip, neck, shoulder    Chronic pain     back    GERD (gastroesophageal reflux disease)     Psychiatric disorder     anxiety     Past Surgical History:   Procedure Laterality Date    HX ORTHOPAEDIC  1980    Ulna nerve repair Right arm    HX ORTHOPAEDIC  1982    Tendon repair Right hand    HX TUBAL LIGATION  1989       Personal factors and/or comorbidities impacting plan of care:     Home Situation  Home Environment: Private residence  # Steps to Enter:  (has a ramp)  One/Two Story Residence: Two story  # of Interior Steps: 12  Living Alone: No  Support Systems: Spouse/Significant Other  Patient Expects to be Discharged to[de-identified] [employer]  Current DME Used/Available at Home: Shower chair  Tub or Shower Type: Tub/Shower combination    EXAMINATION/PRESENTATION/DECISION MAKING:   Critical Behavior:  Neurologic State: Alert  Orientation Level: Oriented X4  Cognition: Appropriate decision making,Appropriate for age attention/concentration,Appropriate safety awareness  Safety/Judgement: Awareness of environment,Good awareness of safety precautions,Fall prevention,Home safety,Insight into deficits  Hearing: Auditory  Auditory Impairment: None  Skin:  intact  Edema: none  Range Of Motion:  AROM: Within functional limits           PROM: Within functional limits           Strength:    Strength: Generally decreased, functional                    Tone & Sensation:   Tone: Normal              Sensation: Intact               Coordination:  Coordination: Within functional limits  Vision:   Acuity: Within Defined Limits  Corrective Lenses: Glasses  Functional Mobility:  Bed Mobility:  Rolling: Supervision  Supine to Sit: Supervision     Scooting: Supervision; Additional time  Transfers:  Sit to Stand: Contact guard assistance  Stand to Sit: Contact guard assistance        Bed to Chair: Contact guard assistance; Additional time              Balance:   Sitting: Intact  Standing: Impaired  Standing - Static: Good;Constant support  Standing - Dynamic : Fair;Constant support  Ambulation/Gait Training:  Distance (ft): 40 Feet (ft)  Assistive Device:  (HHA)  Ambulation - Level of Assistance: Contact guard assistance        Gait Abnormalities: Decreased step clearance        Base of Support: Widened     Speed/Juhi: Slow  Step Length: Left shortened;Right shortened                  Functional Measure:  Barthel Index:    Bathin  Bladder: 10  Bowels: 10  Groomin  Dressin  Feeding: 10  Mobility: 5  Stairs: 5  Toilet Use: 5  Transfer (Bed to Chair and Back): 10  Total: 65/100       The Barthel ADL Index: Guidelines  1. The index should be used as a record of what a patient does, not as a record of what a patient could do. 2. The main aim is to establish degree of independence from any help, physical or verbal, however minor and for whatever reason.   3. The need for supervision renders the patient not independent. 4. A patient's performance should be established using the best available evidence. Asking the patient, friends/relatives and nurses are the usual sources, but direct observation and common sense are also important. However direct testing is not needed. 5. Usually the patient's performance over the preceding 24-48 hours is important, but occasionally longer periods will be relevant. 6. Middle categories imply that the patient supplies over 50 per cent of the effort. 7. Use of aids to be independent is allowed. Score Interpretation (from [address] [address])    Independent   60-79 Minimally independent   40-59 Partially dependent   20-39 Very dependent   <20 Totally dependent     -Alejo Cuadra., Barthel, DBoyW. (1965). Functional evaluation: the Barthel Index. [facility] ([address] (1997). The Barthel activities of daily living index: self-reporting versus actual performance in the old (> or = 75 years). Journal of [address] [place] [address] 45(7), [facility], NANCY, Nasreen Browne., Zahraa Manus. (1999). Measuring the change in disability after inpatient rehabilitation; comparison of the responsiveness of the Barthel Index and Functional [place] Measure. Journal of Neurology, Neurosurgery, and Psychiatry, 66(4), 485-113. Viola Dominique, N.J.A, JESSIE Montoya, & Colette Candelaria, MBoyA. (2004) Assessment of post-stroke quality of life in cost-effectiveness studies: The usefulness of the Barthel Index and the EuroQoL-5D.  Quality of Life Research, 15, 182-59        Physical Therapy Evaluation Charge Determination   History Examination Presentation Decision-Making   HIGH Complexity :3+ comorbidities / personal factors will impact the outcome/ POC  MEDIUM Complexity : 3 Standardized tests and measures addressing body structure, function, activity limitation and / or participation in recreation  MEDIUM Complexity : Evolving with changing characteristics  Other outcome measures barthel  MEDIUM      Based on the above components, the patient evaluation is determined to be of the following complexity level: MEDIUM    Pain Rating:  hips    Activity Tolerance:   Fair    After treatment patient left in no apparent distress:   Sitting in chair and Call bell within reach    COMMUNICATION/EDUCATION:   The patients plan of care was discussed with: Occupational therapist and Registered nurse. Fall prevention education was provided and the patient/caregiver indicated understanding. and Patient/family agree to work toward stated goals and plan of care.     Thank you for this referral.  Sandra Garcia, PT, DPT   Time Calculation: 31 mins

## 2021-12-21 NOTE — ED NOTES
Patient is being transferred to [address], Room # 2132. Report given to Renee Blanco RN on [facility] for routine progression of care. Report consisted of the following information SBAR, Kardex and Intake/Output. Patient transferred to receiving unit by transport. Outstanding consults needed: No     Next labs due: No     The following personal items will be sent with the patient during transfer to the floor:     All valuables:    Cardiac monitoring ordered: Yes    The following CURRENT information was reported to the receiving RN:    Code status: Full Code at time of transfer    Last set of vital signs:  Vital Signs  Level of Consciousness: Alert (0) (12/20/21 0949)  Temp: 98.8 °F (37.1 °C) (12/20/21 0949)  Temp Source: Oral (12/20/21 0949)  Pulse (Heart Rate): 74 (12/20/21 1813)  Cardiac Rhythm: Sinus Rhythm (12/20/21 1049)  Resp Rate: 14 (12/20/21 1813)  BP: 128/68 (12/20/21 1813)  MAP (Monitor): 84 (12/20/21 1813)  MAP (Calculated): 88 (12/20/21 1813)  BP 1 Location: Right upper arm (12/20/21 0949)  BP Patient Position: At rest (12/20/21 0949)  MEWS Score: 1 (12/20/21 0949)         Oxygen Therapy  O2 Sat (%): 97 % (12/20/21 1813)  Pulse via Oximetry: 74 beats per minute (12/20/21 1813)  O2 Device: Nasal cannula (12/20/21 1757)  O2 Flow Rate (L/min): 2 l/min (12/20/21 1757)      Last pain assessment:  Pain 1  Pain Scale 1: Numeric (0 - 10)  Pain Intensity 1: 5  Patient Stated Pain Goal: 1  Pain Onset 1: 2 weeks ago  Pain Location 1: Generalized  Pain Description 1: Aching      Wounds: No     Urinary catheter: voiding  Is there a calvillo order: No     LDAs:       Peripheral IV 12/20/21 Left Antecubital (Active)   Site Assessment Clean, dry, & intact 12/20/21 0939   Phlebitis Assessment 0 12/20/21 0939   Infiltration Assessment 0 12/20/21 0939   Dressing Status Clean, dry, & intact 12/20/21 3909   Dressing Type Tape;Transparent 12/20/21 2192         Opportunity for questions and clarification was provided.     Gloria Swan RN

## 2021-12-22 VITALS
HEIGHT: 60 IN | BODY MASS INDEX: 29.45 KG/M2 | DIASTOLIC BLOOD PRESSURE: 78 MMHG | WEIGHT: 150 LBS | SYSTOLIC BLOOD PRESSURE: 134 MMHG | OXYGEN SATURATION: 96 % | HEART RATE: 76 BPM | TEMPERATURE: 98.5 F | RESPIRATION RATE: 18 BRPM

## 2021-12-22 PROCEDURE — 74011250637 HC RX REV CODE- 250/637: Performed by: HOSPITALIST

## 2021-12-22 PROCEDURE — 94760 N-INVAS EAR/PLS OXIMETRY 1: CPT

## 2021-12-22 PROCEDURE — 94761 N-INVAS EAR/PLS OXIMETRY MLT: CPT

## 2021-12-22 PROCEDURE — 74011250636 HC RX REV CODE- 250/636: Performed by: HOSPITALIST

## 2021-12-22 RX ORDER — DEXAMETHASONE 4 MG/1
4 TABLET ORAL
Qty: 7 TABLET | Refills: 0 | Status: SHIPPED | OUTPATIENT
Start: 2021-12-22 | End: 2021-12-29

## 2021-12-22 RX ADMIN — DEXAMETHASONE SODIUM PHOSPHATE 4 MG: 4 INJECTION, SOLUTION INTRAMUSCULAR; INTRAVENOUS at 15:00

## 2021-12-22 RX ADMIN — ZINC SULFATE 220 MG (50 MG) CAPSULE 1 CAPSULE: CAPSULE at 09:25

## 2021-12-22 RX ADMIN — ENOXAPARIN SODIUM 40 MG: 100 INJECTION SUBCUTANEOUS at 09:25

## 2021-12-22 RX ADMIN — VENLAFAXINE HYDROCHLORIDE 75 MG: 37.5 CAPSULE, EXTENDED RELEASE ORAL at 09:24

## 2021-12-22 RX ADMIN — OXYCODONE HYDROCHLORIDE AND ACETAMINOPHEN 500 MG: 500 TABLET ORAL at 09:25

## 2021-12-22 RX ADMIN — Medication 10 ML: at 15:00

## 2021-12-22 RX ADMIN — BARICITINIB 4 MG: 2 TABLET, FILM COATED ORAL at 09:29

## 2021-12-22 NOTE — PROGRESS NOTES
Pt ready for discharge from a CM standpoint. Pt's sister will transport once patient and her spouse is ready for discharge. Pt will have AT [address] [place] [address] to provide home health services. Care Management Interventions  PCP Verified by CM: Yes  Mode of Transport at Discharge: Other (see comment) (Patient's sister to transport vs. BLS transport if transition to SNF)  Transition of Care Consult (CM Consult): Discharge Planning  Discharge Durable Medical Equipment: No  Physical Therapy Consult: Yes  Occupational Therapy Consult: Yes  Speech Therapy Consult: No  Support Systems: Spouse/Significant Other  Confirm Follow Up Transport: Self  The Plan for Transition of Care is Related to the Following Treatment Goals : [address] [place] [address]  The Patient and/or Patient Representative was Provided with a Choice of Provider and Agrees with the Discharge Plan?: Yes  Name of the Patient Representative Who was Provided with a Choice of Provider and Agrees with the Discharge Plan: patient  Freedom of Choice List was Provided with Basic Dialogue that Supports the Patient's Individualized Plan of Care/Goals, Treatment Preferences and Shares the Quality Data Associated with the Providers?: Yes  Discharge Location  Discharge Placement: Home with home health      Shyanne Cortes.  Luma Camposs, [address] [place] - ED [facility]  Advanced Steps ACP Facilitator  Zone Phone: [phone]

## 2021-12-22 NOTE — DISCHARGE SUMMARY
Hospitalist Discharge Summary     Patient ID:  Yanet Mcmahon  [id number]  79 y.o.  1950 12/20/2021    PCP on record: Rao Tellez NP    Admit date: 12/20/2021  Discharge date and time: 12/22/2021    DISCHARGE DIAGNOSIS:    Bilateral covid-19 pneumonia, POA      CONSULTATIONS:  None    Excerpted HPI from H&P of Prakash Quach MD:  Yanet Mcmahon is a 70 y.o. female PMH anxiety, arthritis, unvaccinated for covid 19.  2 weeks ago, developed low grade fever, myalgias, waxing and waning generalized weakness, nonproductive cough, decreased appetite, diarrhea whenever she goes to void.  hospitalized for covid-19 about 2-3 days ago. She was going to get tested today with pcp but was too weak to get out of bed. Baseline independent ADLs. Denies chest pain, abdominal pain, vomiting. No dysuria, change in urination.     ______________________________________________________________________  DISCHARGE SUMMARY/HOSPITAL COURSE:  for full details see H&P, daily progress notes, labs, consult notes. Bilateral covid-19 pneumonia, POA with   Acute hypoxic respiratory failure, POA   88% RA, requiring 2L O2  Unvaccinated patient  Generalized weakness      -Was admitted due to hypoxia contrast weakness, was started on dexamethasone. She was out of window for remdesivir. Her oxygen requirement had improved. She reports she feels better. She will be discharged home    Symptoms onset 2 weeks PTA, advised to isolate until 12/24/21, and follow CDC guidelines     Anxiety d/o  --continue venlafaxine                   _______________________________________________________________________  Patient seen and examined by me on discharge day. Pertinent Findings:  Gen:    Not in distress  Chest: Clear lungs  CVS:   Regular rhythm.   No edema  Abd:  Soft, not distended, not tender  Neuro:  Alert,   _______________________________________________________________________  DISCHARGE MEDICATIONS: Current Discharge Medication List      START taking these medications    Details   guaiFENesin-dextromethorphan SR (HUMIBID DM) 600-30 mg per tablet Take 1 Tablet by mouth every twelve (12) hours as needed for Cough. Qty: 10 Tablet, Refills: 0  Start date: 12/22/2021      dexAMETHasone (DECADRON) 4 mg tablet Take 4 mg by mouth Daily (before breakfast) for 7 days. Qty: 7 Tablet, Refills: 0  Start date: 12/22/2021, End date: 12/29/2021         CONTINUE these medications which have NOT CHANGED    Details   venlafaxine-SR (EFFEXOR XR) 75 mg capsule Take 75 mg by mouth daily. Patient Follow Up Instructions: Activity: Activity as tolerated  Diet: Regular Diet      Follow-up Information     Follow up With Specialties Details Why Contact Info    Shawn Nova NP Nurse Practitioner In 1 week Please see your PCP for a hospital follow up appointment within one week.   [id number]  [address] [phone]  [facility]  This is the  [facility] agency that will provide nursing, physical therapy and occupational therapy in your home [address] [place] [address]  [phone]        ________________________________________________________________    Risk of deterioration: Low    Condition at Discharge:  Stable  __________________________________________________________________    Disposition  Home with family, no needs    ____________________________________________________________________    Code Status: Full Code  ___________________________________________________________________      Total time in minutes spent coordinating this discharge (includes going over instructions, follow-up, prescriptions, and preparing report for sign off to her PCP) :  >30 minutes    Signed:  Josue Rowland MD

## 2021-12-22 NOTE — PROGRESS NOTES
Attempted to schedule follow up appointment. CP is with [facility], walk-in facility from 8:00 am to 8:00 a.m. Attempted to schedule virtual visit for patient but could not get through to the telehealth . Pending patient discharge.   Beata Crowley, Care Management Assistant

## 2021-12-22 NOTE — PROGRESS NOTES
COUNSELING: Pt received discharge instructions and prescriptions. Pt states understanding of follow-up care ans side effects of medications. Pt given opportunity for questions and clarifications. IV removed. Pt has all belongings.  Pedro Ashby RN

## 2021-12-22 NOTE — DISCHARGE INSTRUCTIONS
HOSPITALIST DISCHARGE INSTRUCTIONS    NAME: Carlos Whitfield   :  1950   MRN:  [id number]     Date/Time:  2021 10:33 AM    ADMIT DATE: 2021   DISCHARGE DATE: 2021     Bilateral covid-19 pneumonia, POA    Isolate yourself until 21    · It is important that you take the medication exactly as they are prescribed. · Keep your medication in the bottles provided by the pharmacist and keep a list of the medication names, dosages, and times to be taken in your wallet. · Do not take other medications without consulting your doctor. What to do at [address]:  Resume previous diet    Recommended activity: Activity as tolerated      If you have questions regarding the hospital related prescriptions or hospital related issues please call [facility] at [phone]. You can always direct your questions to your primary care doctor if you are unable to reach your hospital physician; your PCP works as an extension of your hospital doctor just like your hospital doctor is an extension of your PCP for your time at the hospital [facility])    If you experience any of the following symptoms then please call your primary care physician or return to the emergency room if you cannot get hold of your doctor:    Fever, chills, nausea, vomiting, or persistent diarrhea  Worsening weakness or new problems with your speech or balance  Dark stools or visible blood in your stools  New Leg swelling or shortness of breath as these could be signs of a clot    Additional Instructions:      Bring these papers with you to your follow up appointments. The papers will help your doctors be sure to continue the care plan from the hospital.              Information obtained by :  I understand that if any problems occur once I am at home I am to contact my physician. I understand and acknowledge receipt of the instructions indicated above. [de-identified] or R.N.'s Signature                                                                  Date/Time                                                                                                                                              Patient or Representative Signature                                       Preventing the Spread of Coronavirus Disease 2019 in Homes and Residential Communities  For the most recent information go to [employer].    Prevention steps for People with confirmed or suspected COVID-19 (including persons under investigation) who do not need to be hospitalized  and   People with confirmed COVID-19 who were hospitalized and determined to be medically stable to go home    Your healthcare provider and public health staff will evaluate whether you can be cared for at home. If it is determined that you do not need to be hospitalized and can be isolated at home, you will be monitored by staff from your local or state health department. You should follow the prevention steps below until a healthcare provider or local or state health department says you can return to your normal activities. Stay home except to get medical care  People who are mildly ill with COVID-19 are able to isolate at home during their illness. You should restrict activities outside your home, except for getting medical care. Do not go to work, school, or public areas. Avoid using public transportation, ride-sharing, or taxis. Separate yourself from other people and animals in your home  People: As much as possible, you should stay in a specific room and away from other people in your home. Also, you should use a separate bathroom, if available. Animals:  You should restrict contact with pets and other animals while you are sick with COVID-19, just like you would around other people. Although there have not been reports of pets or other animals becoming sick with COVID-19, it is still recommended that people sick with COVID-19 limit contact with animals until more information is known about the virus. When possible, have another member of your household care for your animals while you are sick. If you are sick with COVID-19, avoid contact with your pet, including petting, snuggling, being kissed or licked, and sharing food. If you must care for your pet or be around animals while you are sick, wash your hands before and after you interact with pets and wear a facemask. Call ahead before visiting your doctor  If you have a medical appointment, call the healthcare provider and tell them that you have or may have COVID-19. This will help the healthcare providers office take steps to keep other people from getting infected or exposed. Wear a facemask  You should wear a facemask when you are around other people (e.g., sharing a room or vehicle) or pets and before you enter a healthcare providers office. If you are not able to wear a facemask (for example, because it causes trouble breathing), then people who live with you should not stay in the same room with you, or they should wear a facemask if they enter your room. Cover your coughs and sneezes  Cover your mouth and nose with a tissue when you cough or sneeze. Throw used tissues in a lined trash can. Immediately wash your hands with soap and water for at least 20 seconds or, if soap and water are not available, clean your hands with an alcohol-based hand  that contains at least 60% alcohol. Clean your hands often  Wash your hands often with soap and water for at least 20 seconds, especially after blowing your nose, coughing, or sneezing; going to the bathroom; and before eating or preparing food.  If soap and water are not readily available, use an alcohol-based hand  with at least 60% alcohol, covering all surfaces of your hands and rubbing them together until they feel dry. Soap and water are the best option if hands are visibly dirty. Avoid touching your eyes, nose, and mouth with unwashed hands. Avoid sharing personal household items  You should not share dishes, drinking glasses, cups, eating utensils, towels, or bedding with other people or pets in your home. After using these items, they should be washed thoroughly with soap and water. Clean all high-touch surfaces everyday  High touch surfaces include counters, tabletops, doorknobs, bathroom fixtures, toilets, phones, keyboards, tablets, and bedside tables. Also, clean any surfaces that may have blood, stool, or body fluids on them. Use a household cleaning spray or wipe, according to the label instructions. Labels contain instructions for safe and effective use of the cleaning product including precautions you should take when applying the product, such as wearing gloves and making sure you have good ventilation during use of the product. Monitor your symptoms  Seek prompt medical attention if your illness is worsening (e.g., difficulty breathing). Before seeking care, call your healthcare provider and tell them that you have, or are being evaluated for, COVID-19. Put on a facemask before you enter the facility. These steps will help the healthcare providers office to keep other people in the office or waiting room from getting infected or exposed. Ask your healthcare provider to call the local or state health department. Persons who are placed under active monitoring or facilitated self-monitoring should follow instructions provided by their local health department or occupational health professionals, as appropriate. When working with your local health department check their available hours. If you have a medical emergency and need to call 911, notify the dispatch personnel that you have, or are being evaluated for COVID-19.  If possible, put on a facemask before emergency medical services arrive. Discontinuing home isolation  Patients with confirmed COVID-19 should remain under home isolation precautions until the risk of secondary transmission to others is thought to be low. The decision to discontinue home isolation precautions should be made on a case-by-case basis, in consultation with healthcare providers and state and local health departments.

## 2021-12-23 ENCOUNTER — PATIENT OUTREACH (OUTPATIENT)
Dept: CASE MANAGEMENT | Age: 71
End: 2021-12-23

## 2021-12-23 NOTE — PROGRESS NOTES
Care Transitions Initial Call    Call within 2 business days of discharge: Yes     Patient: Miguel Lopez Patient : 1950 MRN: [id number]    Last Discharge [address]       Complaint Diagnosis Description Type Department Provider    21 Lethargy Pneumonia due to COVID-19 virus . .. ED to Hosp-Admission (Discharged) (ADMIT) Jil Washburn MD; Jose Alexis. .. Was this an external facility discharge? No    Challenges to be reviewed by the provider   Additional needs identified to be addressed with provider: no       Method of communication with provider : none    Discussed COVID-19 related testing which was available at this time. Test results were positive. Patient informed of results, if available? yes     Advance Care Planning:   Does patient have an Advance Directive:  [address] on file    Inpatient Readmission Risk score: Unplanned Readmit Risk Score: 6.3 ( )    Was this a readmission? no   Patient stated reason for the admission: weakness, cough, diarrhea    Patients top risk factors for readmission: medical condition-COVID/PNA   Interventions to address risk factors: Scheduled appointment with PCP-(pt.will schedule) and Obtained and reviewed discharge summary and/or continuity of care documents    Care Transition Nurse (CTN) contacted the patient by telephone to perform post hospital discharge assessment. Verified name and  with patient as identifiers. Provided introduction to self, and explanation of the CTN role. CTN reviewed discharge instructions, medical action plan and red flags with patient who verbalized understanding. Were discharge instructions available to patient? no. Reviewed appropriate site of care based on symptoms and resources available to patient including: PCP, Home Health, When to call 911 and [facility]. Patient given an opportunity to ask questions and does not have any further questions or concerns at this time.  The patient agrees to contact the PCP office for questions related to their healthcare. Medication reconciliation was performed with patient, who verbalizes understanding of administration of home medications. Advised obtaining a 90-day supply of all daily and as-needed medications. Referral to Pharm D needed: no   START taking these medications     Details   guaiFENesin-dextromethorphan SR (HUMIBID DM) 600-30 mg per tablet Take 1 Tablet by mouth every twelve (12) hours as needed for Cough. Qty: 10 Tablet, Refills: 0  Start date: 12/22/2021       dexAMETHasone (DECADRON) 4 mg tablet Take 4 mg by mouth Daily (before breakfast) for 7 days. Qty: 7 Tablet, Refills: 0  Start date: 12/22/2021, End date: 12/29/2021     STOP taking:  aspirin 325 mg tablet (ASPIRIN)  Fish OiL 300 mg Cap    Home Health/Outpatient orders at discharge: home health care  [facility] (PT[facility]OT)  Date of initial visit: pt.contacted 12/23/21    Covid Risk Education    Decline education COVID-19 and CDC guidelines. CTN reviewed discharge instructions, medical action plan and red flag symptoms with the patient who verbalized understanding. Patient was given an opportunity to verbalize any questions and concerns and agrees to contact CTN or health care provider for questions related to their healthcare. Discussed follow-up appointments: yes. Is follow up appointment scheduled within 7 days of discharge? Pt.prefer to schedule. [facility]s): No future appointments. Plan for follow-up call in 7-10 days based on severity of symptoms and risk factors. Plan for next call: follow up appointment-pcp  CTN provided contact information for future needs. Goals      Establish PCP relationships and regularly scheduled appointments. 12/23 Pt.will attend all recommended follow ups with pcp and specialist w/n 30 day period.  Pt.scheduled hosp.follow up:  (PCP) Masha Lama NP (pt.will schedule)  At [facility] (PT/OT)  CTN provided pt. information [facility] [phone]) explain briefly type of service; contact information provided, f/u with pt.in 7-10 days. -                               Understands red flags post discharge. 12/23 Pt.will report to physician or return to ED for worsening sx, fatigue, increased fever, congestion, increase SOB or difficulty breathing. Pt.will continue with self isolation as directed 10-14 days (12/24/21), if sx not resolved pt.will discuss continue isolation with PCP for further instructions. CTN will follow up with pt.in 7-10 days. -[address]

## 2022-01-18 ENCOUNTER — PATIENT OUTREACH (OUTPATIENT)
Dept: CASE MANAGEMENT | Age: 72
End: 2022-01-18

## 2022-01-18 NOTE — PROGRESS NOTES
Follow Up Call    Challenges to be reviewed by the provider   Additional needs identified to be addressed with provider: no         Encounter was not routed to provider for escalation. Method of communication with provider: none. Contacted the patient by telephone to follow up after hospital visit. Status: significantly improved; mild sob with exertion    [facility] follow up appointment(s): No future appointments. Follow up appointment completed? yes. Provided contact information for future needs. No further follow-up call indicated based on severity of symptoms and risk factors.   Estela Juárez RN

## 2022-03-19 PROBLEM — J12.82 PNEUMONIA DUE TO COVID-19 VIRUS: Status: ACTIVE | Noted: 2021-12-20

## 2022-03-19 PROBLEM — U07.1 PNEUMONIA DUE TO COVID-19 VIRUS: Status: ACTIVE | Noted: 2021-12-20

## 2022-07-25 ENCOUNTER — ANESTHESIA EVENT (OUTPATIENT)
Dept: ENDOSCOPY | Age: 72
End: 2022-07-25
Payer: MEDICARE

## 2022-07-25 ENCOUNTER — ANESTHESIA (OUTPATIENT)
Dept: ENDOSCOPY | Age: 72
End: 2022-07-25
Payer: MEDICARE

## 2022-07-25 ENCOUNTER — HOSPITAL ENCOUNTER (OUTPATIENT)
Age: 72
Setting detail: OUTPATIENT SURGERY
Discharge: HOME | End: 2022-07-25
Attending: INTERNAL MEDICINE | Admitting: INTERNAL MEDICINE
Payer: MEDICARE

## 2022-07-25 VITALS
SYSTOLIC BLOOD PRESSURE: 141 MMHG | HEART RATE: 68 BPM | RESPIRATION RATE: 12 BRPM | DIASTOLIC BLOOD PRESSURE: 63 MMHG | WEIGHT: 153.9 LBS | BODY MASS INDEX: 31.03 KG/M2 | TEMPERATURE: 98 F | OXYGEN SATURATION: 99 % | HEIGHT: 59 IN

## 2022-07-25 PROCEDURE — 77030013992 HC SNR POLYP ENDOSC BSC -B: Performed by: INTERNAL MEDICINE

## 2022-07-25 PROCEDURE — 77030018712 HC DEV BLLN INFL BSC -B: Performed by: INTERNAL MEDICINE

## 2022-07-25 PROCEDURE — 2709999900 HC NON-CHARGEABLE SUPPLY: Performed by: INTERNAL MEDICINE

## 2022-07-25 PROCEDURE — 74011250636 HC RX REV CODE- 250/636: Performed by: NURSE ANESTHETIST, CERTIFIED REGISTERED

## 2022-07-25 PROCEDURE — 76060000032 HC ANESTHESIA 0.5 TO 1 HR: Performed by: INTERNAL MEDICINE

## 2022-07-25 PROCEDURE — 76040000007: Performed by: INTERNAL MEDICINE

## 2022-07-25 PROCEDURE — 74011000250 HC RX REV CODE- 250: Performed by: NURSE ANESTHETIST, CERTIFIED REGISTERED

## 2022-07-25 PROCEDURE — 88305 TISSUE EXAM BY PATHOLOGIST: CPT

## 2022-07-25 PROCEDURE — 74011250636 HC RX REV CODE- 250/636: Performed by: INTERNAL MEDICINE

## 2022-07-25 PROCEDURE — 77030019988 HC FCPS ENDOSC DISP BSC -B: Performed by: INTERNAL MEDICINE

## 2022-07-25 RX ORDER — FLUMAZENIL 0.1 MG/ML
0.2 INJECTION INTRAVENOUS
Status: DISCONTINUED | OUTPATIENT
Start: 2022-07-25 | End: 2022-07-25 | Stop reason: HOSPADM

## 2022-07-25 RX ORDER — SODIUM CHLORIDE 9 MG/ML
75 INJECTION, SOLUTION INTRAVENOUS CONTINUOUS
Status: DISCONTINUED | OUTPATIENT
Start: 2022-07-25 | End: 2022-07-25 | Stop reason: HOSPADM

## 2022-07-25 RX ORDER — MIDAZOLAM HYDROCHLORIDE 1 MG/ML
.25-5 INJECTION, SOLUTION INTRAMUSCULAR; INTRAVENOUS
Status: DISCONTINUED | OUTPATIENT
Start: 2022-07-25 | End: 2022-07-25 | Stop reason: HOSPADM

## 2022-07-25 RX ORDER — EPINEPHRINE 0.1 MG/ML
1 INJECTION INTRACARDIAC; INTRAVENOUS
Status: DISCONTINUED | OUTPATIENT
Start: 2022-07-25 | End: 2022-07-25 | Stop reason: HOSPADM

## 2022-07-25 RX ORDER — DEXTROMETHORPHAN/PSEUDOEPHED 2.5-7.5/.8
1.2 DROPS ORAL
Status: DISCONTINUED | OUTPATIENT
Start: 2022-07-25 | End: 2022-07-25 | Stop reason: HOSPADM

## 2022-07-25 RX ORDER — NALOXONE HYDROCHLORIDE 0.4 MG/ML
0.4 INJECTION, SOLUTION INTRAMUSCULAR; INTRAVENOUS; SUBCUTANEOUS
Status: DISCONTINUED | OUTPATIENT
Start: 2022-07-25 | End: 2022-07-25 | Stop reason: HOSPADM

## 2022-07-25 RX ORDER — SODIUM CHLORIDE 0.9 % (FLUSH) 0.9 %
5-40 SYRINGE (ML) INJECTION AS NEEDED
Status: DISCONTINUED | OUTPATIENT
Start: 2022-07-25 | End: 2022-07-25 | Stop reason: HOSPADM

## 2022-07-25 RX ORDER — LIDOCAINE HYDROCHLORIDE 20 MG/ML
INJECTION, SOLUTION EPIDURAL; INFILTRATION; INTRACAUDAL; PERINEURAL AS NEEDED
Status: DISCONTINUED | OUTPATIENT
Start: 2022-07-25 | End: 2022-07-25 | Stop reason: HOSPADM

## 2022-07-25 RX ORDER — ATROPINE SULFATE 0.1 MG/ML
0.5 INJECTION INTRAVENOUS
Status: DISCONTINUED | OUTPATIENT
Start: 2022-07-25 | End: 2022-07-25 | Stop reason: HOSPADM

## 2022-07-25 RX ORDER — PROPOFOL 10 MG/ML
INJECTION, EMULSION INTRAVENOUS AS NEEDED
Status: DISCONTINUED | OUTPATIENT
Start: 2022-07-25 | End: 2022-07-25 | Stop reason: HOSPADM

## 2022-07-25 RX ORDER — SODIUM CHLORIDE 0.9 % (FLUSH) 0.9 %
5-40 SYRINGE (ML) INJECTION EVERY 8 HOURS
Status: DISCONTINUED | OUTPATIENT
Start: 2022-07-25 | End: 2022-07-25 | Stop reason: HOSPADM

## 2022-07-25 RX ADMIN — PROPOFOL 30 MG: 10 INJECTION, EMULSION INTRAVENOUS at 10:49

## 2022-07-25 RX ADMIN — PROPOFOL 40 MG: 10 INJECTION, EMULSION INTRAVENOUS at 10:39

## 2022-07-25 RX ADMIN — SODIUM CHLORIDE 75 ML/HR: 9 INJECTION, SOLUTION INTRAVENOUS at 10:14

## 2022-07-25 RX ADMIN — PROPOFOL 80 MG: 10 INJECTION, EMULSION INTRAVENOUS at 10:35

## 2022-07-25 RX ADMIN — PROPOFOL 30 MG: 10 INJECTION, EMULSION INTRAVENOUS at 10:51

## 2022-07-25 RX ADMIN — PROPOFOL 30 MG: 10 INJECTION, EMULSION INTRAVENOUS at 10:57

## 2022-07-25 RX ADMIN — LIDOCAINE HYDROCHLORIDE 40 MG: 20 INJECTION, SOLUTION EPIDURAL; INFILTRATION; INTRACAUDAL; PERINEURAL at 10:35

## 2022-07-25 RX ADMIN — PROPOFOL 40 MG: 10 INJECTION, EMULSION INTRAVENOUS at 10:41

## 2022-07-25 RX ADMIN — PROPOFOL 40 MG: 10 INJECTION, EMULSION INTRAVENOUS at 10:37

## 2022-07-25 NOTE — ANESTHESIA POSTPROCEDURE EVALUATION
Procedure(s):  COLONOSCOPY  ESOPHAGOGASTRODUODENOSCOPY (EGD)  ESOPHAGOGASTRODUODENAL (EGD) BIOPSY  ESOPHAGEAL DILATION  ENDOSCOPIC POLYPECTOMY. MAC    Anesthesia Post Evaluation      Multimodal analgesia: multimodal analgesia used between 6 hours prior to anesthesia start to PACU discharge  Patient location during evaluation: PACU  Patient participation: complete - patient participated  Level of consciousness: sleepy but conscious and responsive to verbal stimuli  Pain score: 1  Pain management: adequate  Airway patency: patent  Anesthetic complications: no  Cardiovascular status: acceptable  Respiratory status: acceptable  Hydration status: acceptable  Comments: +Post-Anesthesia Evaluation and Assessment    Patient: Jeane Hager MRN: [id number]  SSN: [id number]   YOB: 1950  Age: 67 y.o. Sex: female      Cardiovascular Function/Vital Signs    /88   Pulse 81   Temp 36.8 °C (98.2 °F)   Resp (!) 122   Ht 4' 11\" (1.499 m)   Wt 69.8 kg (153 lb 14.4 oz)   SpO2 100%   Breastfeeding No   BMI 31.08 kg/m²     Patient is status post Procedure(s):  COLONOSCOPY  ESOPHAGOGASTRODUODENOSCOPY (EGD)  ESOPHAGOGASTRODUODENAL (EGD) BIOPSY  ESOPHAGEAL DILATION  ENDOSCOPIC POLYPECTOMY. Nausea/Vomiting: Controlled. Postoperative hydration reviewed and adequate. Pain:  Pain Scale 1: Numeric (0 - 10) (07/25/22 1007)  Pain Intensity 1: 0 (07/25/22 1007)   Managed. Neurological Status: At baseline. Mental Status and Level of Consciousness: Arousable. Pulmonary Status:   O2 Device: None (Room air) (07/25/22 1007)   Adequate oxygenation and airway patent. Complications related to anesthesia: None    Post-anesthesia assessment completed. No concerns.     Signed By: Franny Hudson MD    7/25/2022  Post anesthesia nausea and vomiting:  controlled  Final Post Anesthesia Temperature Assessment:  Normothermia (36.0-37.5 degrees C)      INITIAL Post-op Vital signs: No vitals data found for the desired time range.

## 2022-07-25 NOTE — PERIOP NOTES
Endoscope was pre-cleaned at bedside immediately following procedure by AURA Laurent     Medications       lidocaine (PF) 2% (mg)       Date/Time Rate/Dose/Volume Action Route Admin User Audit       07/25/22  1035 40 mg Given IntraVENous MazinMiriam oleary CRNA                propofol 10 mg/mL (mg)       Date/Time Rate/Dose/Volume Action Route Admin User Audit       07/25/22  1035 80 mg Given IntraVENous MazinMiriam, CRNA       1037 40 mg Given IntraVENous Marleta JulienMiriam cunningham, CRNA       1039 40 mg Given IntraVENous Marleta JulienMiriam cunningham, CRNA       1041 40 mg Given IntraVENous Marleta JulienMiriam cunningham M, CRNA       1049 30 mg Given IntraVENous Markaea Miriam Victoria, CRNA       1051 30 mg Given IntraVENous Brooklynthalia Abdul, CRNA       1057 30 mg Given IntraVENous Brooklynthalia Abdul CRNA                0.9% sodium chloride infusion (mL)       Date/Time Rate/Dose/Volume Action Route Admin User Audit       07/25/22  1032  Paused  Bautista Abdul CRNA      Comment: Switch to gravity       8981  Restarted IntraVENous Bautista Abdul CRNA edited      6233 75 mL/hr Rate Verify IntraVENous Bautista Abdul, CRNA       1100 400 mL Anesthesia Volume Adjustment IntraVENous Bautista Abdul CRNA

## 2022-07-25 NOTE — PROCEDURES
Colonoscopy Procedure Note    Jeane Hager  1950  [id number]    Indications:  Please see below. Pre-operative Diagnosis: CRC SCREENING    Post-operative Diagnosis: polyp sigmoid colon,internal hemorrhoids    : Wilfredo Eason MD    Referring Provider: Bandar Stringer NP    Sedation:  MAC anesthesia Propofol        Procedure Details:    After detailed informed consent was obtained with all risks and benefits of procedure explained and preoperative exam completed, the patient was taken to the endoscopy suite and placed in the left lateral decubitus position. Upon sequential sedation as per above, a digital rectal exam was performed  and was normal.  The Olympus videocolonoscope  was inserted in the rectum and carefully advanced to the cecum, which was identified by the ileocecal valve and appendiceal orifice. The quality of preparation was good. The colonoscope was slowly withdrawn with careful evaluation between folds. Retroflexion in the rectum was performed. Findings:   The Olympus video high-definition colonoscope was advanced to the cecum, identified by its typical land marks, with ease. A single sessile 6 mm sigmoid colon polyp was removed with a cold snare  The mucosa of the rest of the colon is normal appearing to the cecum with no obvious mucosal pathology (polyp,mass lesion, colitis etc) noted on this colonoscopy. Tiny internal hemorrhoids. Therapies:  1 complete polypectomy performed using cold snare  and the polyp was  retrieved    Specimen/s:      Specimens were collected as described above and sent to pathology. Complications: None were encountered during the procedure. EBL:  None. Recommendations:     -Await pathology. -If adenoma is present, repeat colonoscopy in 5 years. Naturally, for new bleeding, unexplained weight loss,change in bowel habits and anemia, an earlier colonoscopy should be considered. Elma Eason MD  7/25/2022  11:00 AM

## 2022-07-25 NOTE — H&P
Pre-endoscopy H and P    The patient was seen and examined in the room/pre-op holding area. The airway was assessed and documented. The problem list, past medical history, and medications were reviewed. Patient Active Problem List   Diagnosis Code    Pneumonia due to COVID-19 virus U07.1, J12.82     Social History     Socioeconomic History    Marital status:      Spouse name: Not on file    Number of children: Not on file    Years of education: Not on file    Highest education level: Not on file   Occupational History    Not on file   Tobacco Use    Smoking status: Former     Types: Cigarettes     Quit date: 1996     Years since quittin.0    Smokeless tobacco: Never   Substance and Sexual Activity    Alcohol use: Yes     Comment: social    Drug use: Not on file    Sexual activity: Not on file   Other Topics Concern    Not on file   Social History Narrative    Not on file     Social Determinants of Health     Financial Resource Strain: Not on file   Food Insecurity: Not on file   Transportation Needs: Not on file   Physical Activity: Not on file   Stress: Not on file   Social Connections: Not on file   Intimate Partner Violence: Not on file   Housing Stability: Not on file     Past Medical History:   Diagnosis Date    Arthritis     hip, neck, shoulder    Chronic pain     back    GERD (gastroesophageal reflux disease)     Psychiatric disorder     anxiety         Prior to Admission Medications   Prescriptions Last Dose Informant Patient Reported? Taking?   venlafaxine-SR [facility] 75 mg capsule 2022  Yes Yes   Sig: Take 75 mg by mouth daily. Facility-Administered Medications: None           The review of systems is:  Negative  for shortness of breath or chest pain      The heart, lungs, and mental status were satisfactory for the administration of deep sedation and for the procedure.       I discussed with the patient the objectives, risks, consequences and alternatives to the procedure.       Yvrose Wilson MD  7/25/2022  10:32 AM

## 2022-07-25 NOTE — ROUTINE PROCESS
Eliza Horn  1950  [id number]    Situation:  Verbal report received from: Bob Naas  Procedure: Procedure(s):  COLONOSCOPY  ESOPHAGOGASTRODUODENOSCOPY (EGD)  ESOPHAGOGASTRODUODENAL (EGD) BIOPSY  ESOPHAGEAL DILATION  ENDOSCOPIC POLYPECTOMY    Background:    Preoperative diagnosis: GERD, SCREENING  Postoperative diagnosis: EGD: Hiatal Hernia, Schiatzki Ring, Esophagitis, Gastritis  COLON: Polyp, Hemorrhoids    :  Dr. Avelino Lias  Assistant(s): Endoscopy Technician-1: Mary Osman  Endoscopy RN-1: Chi Moralez    Specimens:   ID Type Source Tests Collected by Time Destination   1 : biopsy Preservative Gastric  Noreen Randle MD 7/25/2022 1039 Pathology   2 : biopsy Preservative GE Malika DELEON MD 7/25/2022 1042 Pathology   3 : POLYP Preservative Sigmoid  Noreen Randle MD 7/25/2022 1058 Pathology     H. Pylori  no    Assessment:  Intra-procedure medications     Anesthesia gave intra-procedure sedation and medications, see anesthesia flow sheet yes    Intravenous fluids: NS@ KVO     Vital signs stable     Abdominal assessment: round and soft     No subcutaneous crepitus of the chest or cervical region was noted post procedure. Recommendation:  Discharge patient per MD order.   Family   Permission to share finding with family or friend yes

## 2022-07-25 NOTE — DISCHARGE INSTRUCTIONS
[place] Office: [phone]    Erum Gonsalez  [id number]  1950    EGD/COLONOSCOPY DISCHARGE INSTRUCTIONS  Discomfort:  Sore throat- throat lozenges or warm salt water gargle  redness at IV site- apply warm compress to area; if redness or soreness persist- contact your physician  Gaseous discomfort- walking, belching will help relieve any discomfort  You may not operate a vehicle for 12 hours  You may not engage in an occupation involving machinery or appliances for rest of today. You may not drink alcoholic beverages for at least 12 hours  Avoid making any critical decisions for at least 24 hour  DIET  You may resume your regular diet - however -  remember your colon is empty and a heavy meal will produce gas. Avoid these foods:  fried / greasy foods, excessive carbonated drinks or too much caffeine  MEDICATIONS   Regarding Aspirin or Nonsteroidal medications specifically, please see below. ACTIVITY  You may resume your normal daily activities. Spend the remainder of the day resting -  avoid any strenuous activity. CALL M.D. ANY SIGN OF   Increasing pain, nausea, vomiting  Abdominal distension (swelling)  New increased bleeding (oral or rectal)  Fever (chills)  Pain in chest area  Bloody discharge from nose or mouth  Shortness of breath    You may not take any Advil, Aspirin, Ibuprofen, Motrin, Aleve, or Goodys for 7 days, ONLY  Tylenol as needed for pain. Follow-up Instructions:   Call  Wilfredo Maldonado MD for any questions or concerns  Results of procedure / biopsy in 7 days   Telephone # [phone]  Acid suppression with a proton pump inhibitor suggested,   -Await pathology. ,  -GERD diet: avoid fried and fatty foods. peppermint, chocolate, alcohol, coffee, citrus fruits and juices, tomoato products; avoid lying down for 2 to 3 hours after eating.,  -No NSAIDS  Await pathology. -If adenoma is present, repeat colonoscopy in 5 years.     Naturally, for new bleeding, unexplained weight loss,change in bowel habits and anemia, an earlier colonoscopy should be considered        Patient Education on Sedation / Analgesia Administered for Procedure      For 24 hours after general anesthesia or intravenous analgesia / sedation:  Have someone responsible help you with your care  Limit your activities  Do not drive and operate hazardous machinery  Do not make important personal, legal or business decisions  Do not drink alcoholic beverages  If you have not urinated within 8 hours after discharge, please contact your physician  Resume your medications unless otherwise instructed    For 24 hours after general anesthesia or intravenous analgesia / sedation  you may experience:  Drowsiness, dizziness, sleepiness, or confusion  Difficulty remembering or delayed reaction times  Difficulty with your balance, especially while walking, move slowly and carefully, do not make sudden position changes  Difficulty focusing or blurred vision    You may not be aware of slight changes in your behavior and/or your reaction time because of the medication used during and after your procedure. Report the following to your physician:  Excessive pain, swelling, redness or odor of or around the surgical area  Temperature over 100.5  Nausea and vomiting lasting longer than 4 hours or if unable to take medications  Any signs of decreased circulation or nerve impairment to extremity: change in color, persistent numbness, tingling, coldness or increase pain  Any questions or concerns    IF YOU REPORT TO AN EMERGENCY ROOM, DOCTOR'S OFFICE OR HOSPITAL WITHIN 24 HOURS AFTER YOUR PROCEDURE, BRING THIS SHEET AND YOUR AFTER VISIT SUMMARY WITH YOU AND GIVE IT TO THE PHYSICIAN OR NURSE ATTENDING YOU.     [unfilled]           Learning About Schatzki's Ring  What is Schatzki's ring? A Schatzki's ring is a ring of tissue that forms inside the esophagus, the tube that carries food and liquid to your stomach.  This ring makes the esophagus narrow in one area, close to where it meets the stomach. It can make it hard to swallow. You may feel like food gets stuck in your esophagus. Doctors aren't sure exactly what causes these rings. How is Schatzki's ring diagnosed? Your doctor may check your esophagus if you are having trouble swallowing or if you feel like food is getting stuck. The doctor will use a tool called an endoscope, or scope. It's a thin, flexible, lighted viewing tool. It goes into the mouth and down the throat. Your doctor can use it to check for any problems. The scope can also be used to take a sample of tissue to test (biopsy). You might need an X-ray. For the X-ray, you may need to swallow a substance, such as barium, that makes it easier to see what happens in your esophagus. How is Schatzki's ring treated? A Schatzki's ring is usually treated with a procedure called esophageal dilation. Dilation can open up narrow areas of the esophagus. Before the procedure, you will get medicines through a needle in your vein (IV) in your arm or hand. These medicines reduce pain and will make you feel relaxed and drowsy. Your throat will also be numbed. You may not remember much about the treatment. The doctor will guide a balloon or a plastic tool for widening (dilator) down your throat and into your esophagus. The dilator is used to widen any narrow area. To guide the dilator, the doctor may use a scope. Or he or she may use a thin wire as a guide. After the procedure, you will be observed for 1 to 2 hours until the medicines wear off. If your throat was numbed before the test, you should not eat or drink until your throat is no longer numb. When you are fully recovered, you can go home. Ask your doctor when you can drive again. Your doctor will tell you when you can go back to your usual diet and activities. Do not drink alcohol for 12 to 24 hours after the test.  You may still need to treat some symptoms of GERD.  Your doctor may give you information about that. Follow-up care is a key part of your treatment and safety. Be sure to make and go to all appointments, and call your doctor if you are having problems. It's also a good idea to know your test results and keep a list of the medicines you take. Where can you learn more? Go to http://www.gray.com/  Enter K390 in the search box to learn more about \"Learning About Isa's Ring. \"  Current as of: September 8, 2021               Content Version: 13.2  © 0403-1237 [employer]. Care instructions adapted under license by [employer] (which disclaims liability or warranty for this information). If you have questions about a medical condition or this instruction, always ask your healthcare professional. [address] any warranty or liability for your use of this information. Gastritis: Care Instructions  Your Care Instructions     Gastritis is a sore and upset stomach. It happens when something irritates the stomach lining. Many things can cause it. These include an infection such as the flu or something you ate or drank. Medicines or a sore on the lining of the stomach (ulcer) also can cause it. Your belly may bloat and ache. You may belch, vomit, and feel sick to your stomach. You should be able to relieve the problem by taking medicine. And it may help to change your diet. If gastritis lasts, your doctor may prescribe medicine. Follow-up care is a key part of your treatment and safety. Be sure to make and go to all appointments, and call your doctor if you are having problems. It's also a good idea to know your test results and keep a list of the medicines you take. How can you care for yourself at home? If your doctor prescribed antibiotics, take them as directed. Do not stop taking them just because you feel better. You need to take the full course of antibiotics. Be safe with medicines.  If your doctor prescribed medicine to decrease stomach acid, take it as directed. Call your doctor if you think you are having a problem with your medicine. Do not take any other medicine, including over-the-counter pain relievers, without talking to your doctor first.  If your doctor recommends over-the-counter medicine to reduce stomach acid, such as Pepcid AC (famotidine), Prilosec (omeprazole), or Tagamet HB (cimetidine) follow the directions on the label. Drink plenty of fluids to prevent dehydration. Choose water and other clear liquids. If you have kidney, heart, or liver disease and have to limit fluids, talk with your doctor before you increase the amount of fluids you drink. Avoid foods that make your symptoms worse. These may include chocolate, mint, alcohol, pepper, spicy foods, high-fat foods, or drinks with caffeine in them, such as tea, coffee, sissy, or energy drinks. If your symptoms are worse after you eat a certain food, you may want to stop eating it to see if your symptoms get better. When should you call for help? Call 911 anytime you think you may need emergency care. For example, call if:    You vomit blood or what looks like coffee grounds. You pass maroon or very bloody stools. Call your doctor now or seek immediate medical care if:    You start breathing fast and have not produced urine in the last 8 hours. You cannot keep fluids down. Watch closely for changes in your health, and be sure to contact your doctor if:    You do not get better as expected. Where can you learn more? Go to http://www.dunn.com/  Enter Z536 in the search box to learn more about \"Gastritis: Care Instructions. \"  Current as of: September 8, 2021               Content Version: 13.2  © 6386-3339 [employer]. Care instructions adapted under license by [employer] (which disclaims liability or warranty for this information).  If you have questions about a medical condition or this instruction, always ask your healthcare professional. [address] any warranty or liability for your use of this information. Esophagitis: Care Instructions  Your Care Instructions     Esophagitis (say \"ih-sof-uh-JY-tus\") is irritation of the esophagus, the tube that carries food from your throat to your stomach. Acid reflux is the most common cause of this condition. When you have reflux, stomach acid and juices flow upward. This can cause pain or a burning feeling in your chest. You may have a sore throat. It may be hard to swallow. Other causes of this condition include some medicines and supplements. Allergies or an infection can also cause it. Your doctor will ask about your symptoms and past health. He or she might do tests to find the cause of your symptoms. Treatment depends on what is causing the problem. Treatment might include changing your diet or taking medicine to relieve your symptoms. It might also include changing a medicine that is causing your symptoms. If you have reflux, medicine that reduces the stomach acid helps your body heal. It might take 1 to 3 weeks to heal.  Follow-up care is a key part of your treatment and safety. Be sure to make and go to all appointments, and call your doctor if you are having problems. It's also a good idea to know your test results and keep a list of the medicines you take. How can you care for yourself at home? If you have acid reflux, your doctor may recommend that you:  Eat several small meals instead of two or three large meals. After you eat, wait 2 to 3 hours before you lie down. Avoid chocolate, mint, alcohol, and spicy foods. Don't smoke or use smokeless tobacco. Smoking can make this condition worse. If you need help quitting, talk to your doctor about stop-smoking programs and medicines. These can increase your chances of quitting for good.   Raise the head of your bed 6 to 8 inches if you have symptoms at night. Lose weight if you are overweight. Take an over-the-counter antacid, such as Maalox, Mylanta, or Tums. Be careful when you take over-the-counter antacid medicines. Many of these medicines have aspirin in them. Read the label to make sure that you are not taking more than the recommended dose. Too much aspirin can be harmful. Take stronger acid reducers. Examples are famotidine (such as Pepcid) and omeprazole (such as Prilosec). If your condition is caused by infection, allergy, or other problems, use the medicine or treatments that your doctor recommends. Be safe with medicines. Take your medicines exactly as prescribed. Call your doctor if you think you are having a problem with your medicine. When should you call for help? Call your doctor now or seek immediate medical care if:    You have new or worse belly pain. You are vomiting. Watch closely for changes in your health, and be sure to contact your doctor if:    You have new or worse symptoms of reflux. You have trouble or pain swallowing. You are losing weight. You do not get better as expected. Where can you learn more? Go to http://www.gray.com/  Enter N977 in the search box to learn more about \"Esophagitis: Care Instructions. \"  Current as of: September 8, 2021               Content Version: 13.2  © 7992-6425 Healthwise[employer]. Care instructions adapted under license by [employer] (which disclaims liability or warranty for this information). If you have questions about a medical condition or this instruction, always ask your healthcare professional. [place] [address] any warranty or liability for your use of this information. Hiatal Hernia: Care Instructions  Your Care Instructions  A hiatal hernia occurs when part of the stomach bulges into the chest cavity.   A hiatal hernia may allow stomach acid and juices to back up into the esophagus (acid reflux). This can cause a feeling of burning, warmth, heat, or pain behind the breastbone. This feeling may often occur after you eat, soon after you lie down, or when you bend forward, and it may come and go. You also may have a sour taste in your mouth. These symptoms are commonly known as heartburn or reflux. But not all hiatal hernias cause symptoms. Follow-up care is a key part of your treatment and safety. Be sure to make and go to all appointments, and call your doctor if you are having problems. It's also a good idea to know your test results and keep a list of the medicines you take. How can you care for yourself at home? Take your medicines exactly as prescribed. Call your doctor if you think you are having a problem with your medicine. Do not take aspirin or other nonsteroidal anti-inflammatory drugs (NSAIDs), such as ibuprofen (Advil, Motrin) or naproxen (Aleve), unless your doctor says it is okay. Ask your doctor what you can take for pain. Your doctor may recommend over-the-counter medicine. For mild or occasional indigestion, antacids such as Tums, Gaviscon, Maalox, or Mylanta may help. Your doctor also may recommend over-the-counter acid reducers, such as famotidine (Pepcid AC), cimetidine (Tagamet HB), or omeprazole (Prilosec). Read and follow all instructions on the label. If you use these medicines often, talk with your doctor. Change your eating habits. It's best to eat several small meals instead of two or three large meals. After you eat, wait 2 to 3 hours before you lie down. Late-night snacks aren't a good idea. Avoid foods that make your symptoms worse. These may include chocolate, mint, alcohol, pepper, spicy foods, high-fat foods, or drinks with caffeine in them, such as tea, coffee, sissy, or energy drinks. If your symptoms are worse after you eat a certain food, you may want to stop eating it to see if your symptoms get better.   Do not smoke or chew tobacco.  If you get heartburn at night, raise the head of your bed 6 to 8 inches by putting the frame on blocks or placing a foam wedge under the head of your mattress. (Adding extra pillows does not work.)  Do not wear tight clothing around your middle. Lose weight if you need to. Losing just 5 to 10 pounds can help. When should you call for help? Call your doctor now or seek immediate medical care if:    You have new or worse belly pain. You are vomiting. Watch closely for changes in your health, and be sure to contact your doctor if:    You have new or worse symptoms of indigestion. You have trouble or pain swallowing. You are losing weight. You do not get better as expected. Where can you learn more? Go to http://www.dunn.com/  Enter T074 in the search box to learn more about \"Hiatal Hernia: Care Instructions. \"  Current as of: September 8, 2021               Content Version: 13.2  © 2006-2022 [employer]. Care instructions adapted under license by [employer] (which disclaims liability or warranty for this information). If you have questions about a medical condition or this instruction, always ask your healthcare professional. [place] [address] any warranty or liability for your use of this information. Colon Polyps: Care Instructions  Your Care Instructions     Colon polyps are growths in the colon or the rectum. The cause of most colon polyps is not known, and most people who get them do not have any problems. But a certain kind can turn into cancer. For this reason, regular testing for colon polyps is important for people as they get older. It is also important for anyone who has an increased risk for colon cancer. Polyps are usually found through routine colon cancer screening tests. Although most colon polyps are not cancerous, they are usually removed and then tested for cancer.  Screening for colon cancer saves lives because the cancer can usually be cured if it is caught early. If you have a polyp that is the type that can turn into cancer, you may need more tests to examine your entire colon. The doctor will remove any other polyps that he or she finds, and you will be tested more often. Follow-up care is a key part of your treatment and safety. Be sure to make and go to all appointments, and call your doctor if you are having problems. It's also a good idea to know your test results and keep a list of the medicines you take. How can you care for yourself at home? Regular exams to look for colon polyps are the best way to prevent polyps from turning into colon cancer. These can include stool tests, sigmoidoscopy, colonoscopy, and CT colonography. Talk with your doctor about a testing schedule that is right for you. To prevent polyps  There is no home treatment that can prevent colon polyps. But these steps may help lower your risk for cancer. Stay active. Being active can help you get to and stay at a healthy weight. Try to exercise on most days of the week. Walking is a good choice. Eat well. Choose a variety of vegetables, fruits, legumes (such as peas and beans), fish, poultry, and whole grains. Do not smoke. If you need help quitting, talk to your doctor about stop-smoking programs and medicines. These can increase your chances of quitting for good. If you drink alcohol, limit how much you drink. Limit alcohol to 2 drinks a day for men and 1 drink a day for women. When should you call for help? Call your doctor now or seek immediate medical care if:    You have severe belly pain. Your stools are maroon or very bloody. Watch closely for changes in your health, and be sure to contact your doctor if:    You have a fever. You have nausea or vomiting. You have a change in bowel habits (new constipation or diarrhea). Your symptoms get worse or are not improving as expected.    Where can you learn more? Go to http://www.[employer] Systems.com/  Enter C571 in the search box to learn more about \"Colon Polyps: Care Instructions. \"  Current as of: September 8, 2021               Content Version: 13.2  © 8138-3844 Healthwise, [employer]. Care instructions adapted under license by [employer] (which disclaims liability or warranty for this information). If you have questions about a medical condition or this instruction, always ask your healthcare professional. [place] [address] any warranty or liability for your use of this information.

## 2022-07-25 NOTE — PROGRESS NOTES
FOR EGD SCOPE:    Endoscope was pre-cleaned at bedside immediately following procedure by AURA Laurent

## 2022-07-25 NOTE — PROCEDURES
[place] Office: [phone]      Esophagogastroduodenoscopy Procedure Note      Brittany Padron  1950  [id number]    Indication:  GERD     : sIreal Taveras MD    Referring Provider:  Erinn Eckert NP    Sedation:  MAC anesthesia Propofol    Procedure Details:  After detailed informed consent was obtained for the procedure, with all risks and benefits of procedure explained the patient was taken to the endoscopy suite and placed in the left lateral decubitus position. Following sequential administration of sedation as per above, the endoscope was inserted into the mouth and advanced under direct vision to second portion of the duodenum. A careful inspection was made as the gastroscope was withdrawn, including a retroflexed view of the proximal stomach; findings and interventions are described below. Findings:     Esophagus: The esophageal mucosa in the proximal and mid  esophagus is normal.   LA grade A distal esophagitis noted. There is a complete Schatzki's rings noted at 34 cm. Mucosal biopsies taken. TTS CRE 18 mm balloon dilation done x 1 minute. There is a 3 cm sliding type hiatal hernia noted. Stomach: The gastric mucosa has erythema in the body: biopsies taken. The fundus was found to be normal with no lesions noted on retroflexion. Endoscopic grading of gastroesophageal flap valve (GEFV)/Hill ndgndrndanddndend:nd nd2nd The angularis is normal as well. Duodenum:   The bulb and post bulbar mucosa is normal in appearance. The duodenal folds are normal.     Therapies:  esophageal dilation with 18 mm sized balloon  biopsy of esophagus: as above  biopsy of stomach body    Specimen:  Specimens were collected as described and send to the laboratory. Complications:   None were encountered during the procedure. EBL:  None. Recommendations:     -Acid suppression with a proton pump inhibitor suggested,   -Await pathology. ,   -GERD diet: avoid fried and fatty foods. peppermint, chocolate, alcohol, coffee, citrus fruits and juices, tomoato products; avoid lying down for 2 to 3 hours after eating.,   -No NSAIDS        S.Wilfredo Villarreal MD  7/25/2022  10:45 AM

## 2022-07-25 NOTE — PERIOP NOTES
CRE balloon dilatation of the esophagus   18 mm Balloon inflated to 3 ATMs and held for 60 seconds. No subcutaneous crepitus of the chest or cervical region was noted post dilatation.

## 2022-07-25 NOTE — ANESTHESIA PREPROCEDURE EVALUATION
Relevant Problems   RESPIRATORY SYSTEM   (+) Pneumonia due to COVID-19 virus       Anesthetic History   No history of anesthetic complications            Review of Systems / Medical History  Patient summary reviewed, nursing notes reviewed and pertinent labs reviewed    Pulmonary  Within defined limits                 Neuro/Psych         Psychiatric history     Cardiovascular  Within defined limits                Exercise tolerance: >4 METS     GI/Hepatic/Renal     GERD           Endo/Other        Arthritis     Other Findings              Physical Exam    Airway  Mallampati: II  TM Distance: 4 - 6 cm  Neck ROM: normal range of motion   Mouth opening: Normal     Cardiovascular  Regular rate and rhythm,  S1 and S2 normal,  no murmur, click, rub, or gallop  Rhythm: regular  Rate: normal         Dental  No notable dental hx       Pulmonary  Breath sounds clear to auscultation               Abdominal  GI exam deferred       Other Findings            Anesthetic Plan    ASA: 3  Anesthesia type: MAC          Induction: Intravenous  Anesthetic plan and risks discussed with: Patient

## 2022-08-09 ENCOUNTER — TRANSCRIBE ORDER (OUTPATIENT)
Dept: SCHEDULING | Age: 72
End: 2022-08-09

## 2022-08-09 DIAGNOSIS — Z12.31 VISIT FOR SCREENING MAMMOGRAM: Primary | ICD-10-CM

## 2022-09-13 ENCOUNTER — HOSPITAL ENCOUNTER (OUTPATIENT)
Dept: MAMMOGRAPHY | Age: 72
Discharge: HOME | End: 2022-09-13
Attending: NURSE PRACTITIONER
Payer: MEDICARE

## 2022-09-13 DIAGNOSIS — Z12.31 VISIT FOR SCREENING MAMMOGRAM: ICD-10-CM

## 2022-09-13 PROCEDURE — 77067 SCR MAMMO BI INCL CAD: CPT

## 2023-08-29 ENCOUNTER — TRANSCRIBE ORDERS (OUTPATIENT)
Facility: HOSPITAL | Age: 73
End: 2023-08-29

## 2023-08-29 DIAGNOSIS — Z12.31 SCREENING MAMMOGRAM FOR HIGH-RISK PATIENT: Primary | ICD-10-CM

## 2023-09-15 ENCOUNTER — HOSPITAL ENCOUNTER (OUTPATIENT)
Facility: HOSPITAL | Age: 73
End: 2023-09-15
Payer: MEDICARE

## 2023-09-15 DIAGNOSIS — Z12.31 SCREENING MAMMOGRAM FOR HIGH-RISK PATIENT: ICD-10-CM

## 2023-09-15 PROCEDURE — 77063 BREAST TOMOSYNTHESIS BI: CPT

## 2023-10-13 ENCOUNTER — HOSPITAL ENCOUNTER (OUTPATIENT)
Facility: HOSPITAL | Age: 73
End: 2023-10-13
Payer: MEDICARE

## 2023-10-13 DIAGNOSIS — R92.8 ABNORMAL MAMMOGRAM: ICD-10-CM

## 2023-10-13 PROCEDURE — 77065 DX MAMMO INCL CAD UNI: CPT

## 2024-08-23 ENCOUNTER — TRANSCRIBE ORDERS (OUTPATIENT)
Facility: HOSPITAL | Age: 74
End: 2024-08-23

## 2024-08-23 DIAGNOSIS — Z12.31 SCREENING MAMMOGRAM FOR BREAST CANCER: Primary | ICD-10-CM

## 2024-09-24 ENCOUNTER — HOSPITAL ENCOUNTER (OUTPATIENT)
Facility: HOSPITAL | Age: 74
Discharge: HOME | End: 2024-09-27
Payer: MEDICARE

## 2024-09-24 VITALS — WEIGHT: 153 LBS | HEIGHT: 59 IN | BODY MASS INDEX: 30.84 KG/M2

## 2024-09-24 DIAGNOSIS — Z12.31 SCREENING MAMMOGRAM FOR BREAST CANCER: ICD-10-CM

## 2024-09-24 PROCEDURE — 77063 BREAST TOMOSYNTHESIS BI: CPT

## (undated) DEVICE — SET ADMIN 16ML TBNG L100IN 2 Y INJ SITE IV PIGGY BK DISP

## (undated) DEVICE — HYPODERMIC SAFETY NEEDLE: Brand: MAGELLAN

## (undated) DEVICE — BAG SPEC BIOHZRD 10 X 10 IN --

## (undated) DEVICE — CATH IV AUTOGRD BC PNK 20GA 25 -- INSYTE

## (undated) DEVICE — YANKAUER,TAPERED BULBOUS TIP,W/O VENT: Brand: MEDLINE

## (undated) DEVICE — FORCEPS BX L160CM DIA8MM GRSP DISECT CUP TIP NONLOCKING ROT

## (undated) DEVICE — BASIN EMSIS 16OZ GRAPHITE PLAS KID SHP MOLD GRAD FOR ORAL

## (undated) DEVICE — SOLIDIFIER FLD 2OZ 1500CC N DISINF IN BTL DISP SAFESORB

## (undated) DEVICE — 1200 GUARD II KIT W/5MM TUBE W/O VAC TUBE: Brand: GUARDIAN

## (undated) DEVICE — NEONATAL-ADULT SPO2 SENSOR: Brand: NELLCOR

## (undated) DEVICE — CONTAINER SPEC 20 ML LID NEUT BUFF FORMALIN 10 % POLYPR STS

## (undated) DEVICE — STRAINER URIN CALC RNL MSH -- CONVERT TO ITEM 357634

## (undated) DEVICE — Device

## (undated) DEVICE — SNARE ENDOSCP M L240CM W27MM SHTH DIA2.4MM CHN 2.8MM OVL

## (undated) DEVICE — Z DISCONTINUED PER MEDLINE LINE GAS SAMPLING O2/CO2 LNG AD 13 FT NSL W/ TBNG FILTERLINE

## (undated) DEVICE — TRAP,MUCUS SPECIMEN, 80CC: Brand: MEDLINE

## (undated) DEVICE — SYR 10ML LUER LOK 1/5ML GRAD --

## (undated) DEVICE — TOWEL 4 PLY TISS 19X30 SUE WHT

## (undated) DEVICE — BLOCK BITE ENDOSCP AD 21 MM W/ DIL BLU LF DISP

## (undated) DEVICE — SYR 3ML LL TIP 1/10ML GRAD --

## (undated) DEVICE — ELECTRODE,RADIOTRANSLUCENT,FOAM,5PK: Brand: MEDLINE

## (undated) DEVICE — SYR ASSEMB INFL BLLN 60ML --